# Patient Record
Sex: MALE | Race: WHITE | Employment: FULL TIME | URBAN - METROPOLITAN AREA
[De-identification: names, ages, dates, MRNs, and addresses within clinical notes are randomized per-mention and may not be internally consistent; named-entity substitution may affect disease eponyms.]

---

## 2024-02-22 ENCOUNTER — HOSPITAL ENCOUNTER (INPATIENT)
Age: 54
LOS: 1 days | Discharge: ANOTHER HOSPITAL | DRG: 683 | End: 2024-02-22
Attending: EMERGENCY MEDICINE | Admitting: STUDENT IN AN ORGANIZED HEALTH CARE EDUCATION/TRAINING PROGRAM
Payer: COMMERCIAL

## 2024-02-22 ENCOUNTER — APPOINTMENT (OUTPATIENT)
Dept: CT IMAGING | Age: 54
DRG: 683 | End: 2024-02-22
Payer: COMMERCIAL

## 2024-02-22 ENCOUNTER — HOSPITAL ENCOUNTER (INPATIENT)
Age: 54
LOS: 12 days | Discharge: HOME | DRG: 660 | End: 2024-03-05
Attending: STUDENT IN AN ORGANIZED HEALTH CARE EDUCATION/TRAINING PROGRAM | Admitting: INTERNAL MEDICINE
Payer: COMMERCIAL

## 2024-02-22 VITALS
WEIGHT: 180 LBS | OXYGEN SATURATION: 98 % | DIASTOLIC BLOOD PRESSURE: 76 MMHG | SYSTOLIC BLOOD PRESSURE: 126 MMHG | TEMPERATURE: 97.8 F | RESPIRATION RATE: 16 BRPM | HEIGHT: 70 IN | HEART RATE: 76 BPM | BODY MASS INDEX: 25.77 KG/M2

## 2024-02-22 DIAGNOSIS — N17.9 ACUTE RENAL FAILURE, UNSPECIFIED ACUTE RENAL FAILURE TYPE (HCC): Primary | ICD-10-CM

## 2024-02-22 DIAGNOSIS — D63.8 ANEMIA OF CHRONIC DISEASE: ICD-10-CM

## 2024-02-22 DIAGNOSIS — I47.20 V-TACH (HCC): Primary | ICD-10-CM

## 2024-02-22 PROBLEM — E83.39 HYPERPHOSPHATEMIA: Status: ACTIVE | Noted: 2024-02-22

## 2024-02-22 PROBLEM — D64.9 ANEMIA: Status: ACTIVE | Noted: 2024-02-22

## 2024-02-22 PROBLEM — E87.29 HIGH ANION GAP METABOLIC ACIDOSIS: Status: ACTIVE | Noted: 2024-02-22

## 2024-02-22 PROBLEM — E87.1 HYPONATREMIA: Status: ACTIVE | Noted: 2024-02-22

## 2024-02-22 PROBLEM — K21.9 GERD (GASTROESOPHAGEAL REFLUX DISEASE): Status: ACTIVE | Noted: 2024-02-22

## 2024-02-22 LAB
ALBUMIN SERPL-MCNC: 4.3 G/DL (ref 3.5–5)
ALBUMIN/GLOB SERPL: 1 (ref 0.4–1.6)
ALP SERPL-CCNC: 41 U/L (ref 50–136)
ALT SERPL-CCNC: 10 U/L (ref 12–65)
ANION GAP SERPL CALC-SCNC: 15 MMOL/L (ref 2–11)
APPEARANCE UR: CLEAR
AST SERPL-CCNC: 5 U/L (ref 15–37)
BACTERIA URNS QL MICRO: NEGATIVE /HPF
BASOPHILS # BLD: 0 K/UL (ref 0–0.2)
BASOPHILS NFR BLD: 1 % (ref 0–2)
BILIRUB SERPL-MCNC: 0.4 MG/DL (ref 0.2–1.1)
BILIRUB UR QL: NEGATIVE
BUN SERPL-MCNC: 182 MG/DL (ref 6–23)
CALCIUM SERPL-MCNC: 9.3 MG/DL (ref 8.3–10.4)
CASTS URNS QL MICRO: ABNORMAL /LPF
CHLORIDE SERPL-SCNC: 98 MMOL/L (ref 103–113)
CK SERPL-CCNC: 179 U/L (ref 21–215)
CO2 SERPL-SCNC: 21 MMOL/L (ref 21–32)
COLOR UR: ABNORMAL
CREAT SERPL-MCNC: 22 MG/DL (ref 0.8–1.5)
CREAT UR-MCNC: 75 MG/DL
CREAT UR-MCNC: 77 MG/DL
DIFFERENTIAL METHOD BLD: ABNORMAL
EKG ATRIAL RATE: 74 BPM
EKG DIAGNOSIS: NORMAL
EKG P AXIS: 60 DEGREES
EKG P-R INTERVAL: 164 MS
EKG Q-T INTERVAL: 424 MS
EKG QRS DURATION: 116 MS
EKG QTC CALCULATION (BAZETT): 470 MS
EKG R AXIS: 29 DEGREES
EKG T AXIS: 68 DEGREES
EKG VENTRICULAR RATE: 74 BPM
EOSINOPHIL # BLD: 0.3 K/UL (ref 0–0.8)
EOSINOPHIL NFR BLD: 6 % (ref 0.5–7.8)
EPI CELLS #/AREA URNS HPF: ABNORMAL /HPF
ERYTHROCYTE [DISTWIDTH] IN BLOOD BY AUTOMATED COUNT: 11.9 % (ref 11.9–14.6)
GLOBULIN SER CALC-MCNC: 4.2 G/DL (ref 2.8–4.5)
GLUCOSE SERPL-MCNC: 113 MG/DL (ref 65–100)
GLUCOSE UR STRIP.AUTO-MCNC: NEGATIVE MG/DL
HCT VFR BLD AUTO: 19 % (ref 41.1–50.3)
HCT VFR BLD AUTO: 20.6 % (ref 41.1–50.3)
HGB BLD-MCNC: 6.5 G/DL (ref 13.6–17.2)
HGB BLD-MCNC: 7 G/DL (ref 13.6–17.2)
HGB UR QL STRIP: NEGATIVE
HISTORY CHECK: NORMAL
IMM GRANULOCYTES # BLD AUTO: 0 K/UL (ref 0–0.5)
IMM GRANULOCYTES NFR BLD AUTO: 0 % (ref 0–5)
KETONES UR QL STRIP.AUTO: NEGATIVE MG/DL
LEUKOCYTE ESTERASE UR QL STRIP.AUTO: NEGATIVE
LYMPHOCYTES # BLD: 0.5 K/UL (ref 0.5–4.6)
LYMPHOCYTES NFR BLD: 9 % (ref 13–44)
MAGNESIUM SERPL-MCNC: 2.8 MG/DL (ref 1.8–2.4)
MCH RBC QN AUTO: 31.3 PG (ref 26.1–32.9)
MCHC RBC AUTO-ENTMCNC: 34.2 G/DL (ref 31.4–35)
MCV RBC AUTO: 91.3 FL (ref 82–102)
MONOCYTES # BLD: 0.6 K/UL (ref 0.1–1.3)
MONOCYTES NFR BLD: 11 % (ref 4–12)
NEUTS SEG # BLD: 3.9 K/UL (ref 1.7–8.2)
NEUTS SEG NFR BLD: 73 % (ref 43–78)
NITRITE UR QL STRIP.AUTO: NEGATIVE
NRBC # BLD: 0 K/UL (ref 0–0.2)
PH UR STRIP: 6 (ref 5–9)
PHOSPHATE SERPL-MCNC: 9.9 MG/DL (ref 2.5–4.5)
PLATELET # BLD AUTO: 228 K/UL (ref 150–450)
PMV BLD AUTO: 9 FL (ref 9.4–12.3)
POTASSIUM SERPL-SCNC: 4 MMOL/L (ref 3.5–5.1)
PROT SERPL-MCNC: 8.5 G/DL (ref 6.3–8.2)
PROT UR STRIP-MCNC: 30 MG/DL
PROT UR-MCNC: 39 MG/DL
PROT/CREAT UR-RTO: 0.5
RBC # BLD AUTO: 2.08 M/UL (ref 4.23–5.6)
RBC #/AREA URNS HPF: ABNORMAL /HPF
SODIUM SERPL-SCNC: 134 MMOL/L (ref 136–146)
SODIUM UR-SCNC: 44 MMOL/L
SP GR UR REFRACTOMETRY: 1.01 (ref 1–1.02)
UROBILINOGEN UR QL STRIP.AUTO: 0.2 EU/DL (ref 0.2–1)
WBC # BLD AUTO: 5.4 K/UL (ref 4.3–11.1)
WBC URNS QL MICRO: ABNORMAL /HPF

## 2024-02-22 PROCEDURE — 81001 URINALYSIS AUTO W/SCOPE: CPT

## 2024-02-22 PROCEDURE — 6360000002 HC RX W HCPCS: Performed by: PHYSICIAN ASSISTANT

## 2024-02-22 PROCEDURE — 2500000003 HC RX 250 WO HCPCS: Performed by: STUDENT IN AN ORGANIZED HEALTH CARE EDUCATION/TRAINING PROGRAM

## 2024-02-22 PROCEDURE — 36415 COLL VENOUS BLD VENIPUNCTURE: CPT

## 2024-02-22 PROCEDURE — 86900 BLOOD TYPING SEROLOGIC ABO: CPT

## 2024-02-22 PROCEDURE — 85014 HEMATOCRIT: CPT

## 2024-02-22 PROCEDURE — 85025 COMPLETE CBC W/AUTO DIFF WBC: CPT

## 2024-02-22 PROCEDURE — 86923 COMPATIBILITY TEST ELECTRIC: CPT

## 2024-02-22 PROCEDURE — 86850 RBC ANTIBODY SCREEN: CPT

## 2024-02-22 PROCEDURE — 86901 BLOOD TYPING SEROLOGIC RH(D): CPT

## 2024-02-22 PROCEDURE — 36430 TRANSFUSION BLD/BLD COMPNT: CPT

## 2024-02-22 PROCEDURE — 2580000003 HC RX 258: Performed by: STUDENT IN AN ORGANIZED HEALTH CARE EDUCATION/TRAINING PROGRAM

## 2024-02-22 PROCEDURE — 30233N1 TRANSFUSION OF NONAUTOLOGOUS RED BLOOD CELLS INTO PERIPHERAL VEIN, PERCUTANEOUS APPROACH: ICD-10-PCS | Performed by: STUDENT IN AN ORGANIZED HEALTH CARE EDUCATION/TRAINING PROGRAM

## 2024-02-22 PROCEDURE — 84300 ASSAY OF URINE SODIUM: CPT

## 2024-02-22 PROCEDURE — 1100000000 HC RM PRIVATE

## 2024-02-22 PROCEDURE — 93010 ELECTROCARDIOGRAM REPORT: CPT | Performed by: INTERNAL MEDICINE

## 2024-02-22 PROCEDURE — 82550 ASSAY OF CK (CPK): CPT

## 2024-02-22 PROCEDURE — 96374 THER/PROPH/DIAG INJ IV PUSH: CPT

## 2024-02-22 PROCEDURE — 2580000003 HC RX 258: Performed by: PHYSICIAN ASSISTANT

## 2024-02-22 PROCEDURE — P9016 RBC LEUKOCYTES REDUCED: HCPCS

## 2024-02-22 PROCEDURE — 93005 ELECTROCARDIOGRAM TRACING: CPT | Performed by: EMERGENCY MEDICINE

## 2024-02-22 PROCEDURE — 99285 EMERGENCY DEPT VISIT HI MDM: CPT

## 2024-02-22 PROCEDURE — 83735 ASSAY OF MAGNESIUM: CPT

## 2024-02-22 PROCEDURE — 80053 COMPREHEN METABOLIC PANEL: CPT

## 2024-02-22 PROCEDURE — 74176 CT ABD & PELVIS W/O CONTRAST: CPT

## 2024-02-22 PROCEDURE — 84100 ASSAY OF PHOSPHORUS: CPT

## 2024-02-22 PROCEDURE — 82570 ASSAY OF URINE CREATININE: CPT

## 2024-02-22 PROCEDURE — 84156 ASSAY OF PROTEIN URINE: CPT

## 2024-02-22 PROCEDURE — 85018 HEMOGLOBIN: CPT

## 2024-02-22 RX ORDER — ACETAMINOPHEN 650 MG/1
650 SUPPOSITORY RECTAL EVERY 6 HOURS PRN
Status: DISCONTINUED | OUTPATIENT
Start: 2024-02-22 | End: 2024-03-05 | Stop reason: HOSPADM

## 2024-02-22 RX ORDER — POLYETHYLENE GLYCOL 3350 17 G/17G
17 POWDER, FOR SOLUTION ORAL DAILY PRN
Status: CANCELLED | OUTPATIENT
Start: 2024-02-22

## 2024-02-22 RX ORDER — ONDANSETRON 2 MG/ML
4 INJECTION INTRAMUSCULAR; INTRAVENOUS EVERY 6 HOURS PRN
Status: DISCONTINUED | OUTPATIENT
Start: 2024-02-22 | End: 2024-03-05 | Stop reason: HOSPADM

## 2024-02-22 RX ORDER — SODIUM CHLORIDE 9 MG/ML
INJECTION, SOLUTION INTRAVENOUS PRN
Status: DISCONTINUED | OUTPATIENT
Start: 2024-02-22 | End: 2024-02-22 | Stop reason: HOSPADM

## 2024-02-22 RX ORDER — POLYETHYLENE GLYCOL 3350 17 G/17G
17 POWDER, FOR SOLUTION ORAL DAILY PRN
Status: DISCONTINUED | OUTPATIENT
Start: 2024-02-22 | End: 2024-02-22 | Stop reason: HOSPADM

## 2024-02-22 RX ORDER — SODIUM CHLORIDE 0.9 % (FLUSH) 0.9 %
5-40 SYRINGE (ML) INJECTION PRN
Status: DISCONTINUED | OUTPATIENT
Start: 2024-02-22 | End: 2024-02-22 | Stop reason: HOSPADM

## 2024-02-22 RX ORDER — ACETAMINOPHEN 650 MG/1
650 SUPPOSITORY RECTAL EVERY 6 HOURS PRN
Status: DISCONTINUED | OUTPATIENT
Start: 2024-02-22 | End: 2024-02-22 | Stop reason: HOSPADM

## 2024-02-22 RX ORDER — ONDANSETRON 2 MG/ML
4 INJECTION INTRAMUSCULAR; INTRAVENOUS
Status: COMPLETED | OUTPATIENT
Start: 2024-02-22 | End: 2024-02-22

## 2024-02-22 RX ORDER — HEPARIN SODIUM 5000 [USP'U]/ML
5000 INJECTION, SOLUTION INTRAVENOUS; SUBCUTANEOUS EVERY 8 HOURS SCHEDULED
Status: DISCONTINUED | OUTPATIENT
Start: 2024-02-23 | End: 2024-02-22 | Stop reason: HOSPADM

## 2024-02-22 RX ORDER — SODIUM CHLORIDE 0.9 % (FLUSH) 0.9 %
5-40 SYRINGE (ML) INJECTION PRN
Status: CANCELLED | OUTPATIENT
Start: 2024-02-22

## 2024-02-22 RX ORDER — ONDANSETRON 2 MG/ML
4 INJECTION INTRAMUSCULAR; INTRAVENOUS EVERY 6 HOURS PRN
Status: DISCONTINUED | OUTPATIENT
Start: 2024-02-22 | End: 2024-02-22 | Stop reason: HOSPADM

## 2024-02-22 RX ORDER — SODIUM CHLORIDE 0.9 % (FLUSH) 0.9 %
5-40 SYRINGE (ML) INJECTION PRN
Status: DISCONTINUED | OUTPATIENT
Start: 2024-02-22 | End: 2024-03-05 | Stop reason: HOSPADM

## 2024-02-22 RX ORDER — SODIUM CHLORIDE 0.9 % (FLUSH) 0.9 %
5-40 SYRINGE (ML) INJECTION EVERY 12 HOURS SCHEDULED
Status: CANCELLED | OUTPATIENT
Start: 2024-02-22

## 2024-02-22 RX ORDER — 0.9 % SODIUM CHLORIDE 0.9 %
1000 INTRAVENOUS SOLUTION INTRAVENOUS
Status: COMPLETED | OUTPATIENT
Start: 2024-02-22 | End: 2024-02-22

## 2024-02-22 RX ORDER — SODIUM CHLORIDE 0.9 % (FLUSH) 0.9 %
5-40 SYRINGE (ML) INJECTION EVERY 12 HOURS SCHEDULED
Status: DISCONTINUED | OUTPATIENT
Start: 2024-02-22 | End: 2024-03-05 | Stop reason: HOSPADM

## 2024-02-22 RX ORDER — ACETAMINOPHEN 325 MG/1
650 TABLET ORAL EVERY 6 HOURS PRN
Status: CANCELLED | OUTPATIENT
Start: 2024-02-22

## 2024-02-22 RX ORDER — ACETAMINOPHEN 325 MG/1
650 TABLET ORAL EVERY 6 HOURS PRN
Status: DISCONTINUED | OUTPATIENT
Start: 2024-02-22 | End: 2024-03-05 | Stop reason: HOSPADM

## 2024-02-22 RX ORDER — ONDANSETRON 4 MG/1
4 TABLET, ORALLY DISINTEGRATING ORAL EVERY 8 HOURS PRN
Status: CANCELLED | OUTPATIENT
Start: 2024-02-22

## 2024-02-22 RX ORDER — SODIUM CHLORIDE 9 MG/ML
INJECTION, SOLUTION INTRAVENOUS PRN
Status: CANCELLED | OUTPATIENT
Start: 2024-02-22

## 2024-02-22 RX ORDER — ONDANSETRON 4 MG/1
4 TABLET, ORALLY DISINTEGRATING ORAL EVERY 8 HOURS PRN
Status: DISCONTINUED | OUTPATIENT
Start: 2024-02-22 | End: 2024-02-22 | Stop reason: HOSPADM

## 2024-02-22 RX ORDER — SODIUM CHLORIDE 9 MG/ML
INJECTION, SOLUTION INTRAVENOUS PRN
Status: DISCONTINUED | OUTPATIENT
Start: 2024-02-22 | End: 2024-03-05 | Stop reason: HOSPADM

## 2024-02-22 RX ORDER — SODIUM CHLORIDE 0.9 % (FLUSH) 0.9 %
5-40 SYRINGE (ML) INJECTION EVERY 12 HOURS SCHEDULED
Status: DISCONTINUED | OUTPATIENT
Start: 2024-02-22 | End: 2024-02-22 | Stop reason: HOSPADM

## 2024-02-22 RX ORDER — ACETAMINOPHEN 650 MG/1
650 SUPPOSITORY RECTAL EVERY 6 HOURS PRN
Status: CANCELLED | OUTPATIENT
Start: 2024-02-22

## 2024-02-22 RX ORDER — ACETAMINOPHEN 325 MG/1
650 TABLET ORAL EVERY 6 HOURS PRN
Status: DISCONTINUED | OUTPATIENT
Start: 2024-02-22 | End: 2024-02-22 | Stop reason: HOSPADM

## 2024-02-22 RX ORDER — POLYETHYLENE GLYCOL 3350 17 G/17G
17 POWDER, FOR SOLUTION ORAL DAILY PRN
Status: DISCONTINUED | OUTPATIENT
Start: 2024-02-22 | End: 2024-03-05 | Stop reason: HOSPADM

## 2024-02-22 RX ORDER — ONDANSETRON 2 MG/ML
4 INJECTION INTRAMUSCULAR; INTRAVENOUS EVERY 6 HOURS PRN
Status: CANCELLED | OUTPATIENT
Start: 2024-02-22

## 2024-02-22 RX ORDER — ONDANSETRON 4 MG/1
4 TABLET, ORALLY DISINTEGRATING ORAL EVERY 8 HOURS PRN
Status: DISCONTINUED | OUTPATIENT
Start: 2024-02-22 | End: 2024-03-05 | Stop reason: HOSPADM

## 2024-02-22 RX ADMIN — SODIUM BICARBONATE: 84 INJECTION, SOLUTION INTRAVENOUS at 22:24

## 2024-02-22 RX ADMIN — SODIUM BICARBONATE: 84 INJECTION, SOLUTION INTRAVENOUS at 22:23

## 2024-02-22 RX ADMIN — SODIUM CHLORIDE, PRESERVATIVE FREE 10 ML: 5 INJECTION INTRAVENOUS at 20:32

## 2024-02-22 RX ADMIN — ONDANSETRON 4 MG: 2 INJECTION INTRAMUSCULAR; INTRAVENOUS at 14:14

## 2024-02-22 RX ADMIN — SODIUM BICARBONATE: 84 INJECTION, SOLUTION INTRAVENOUS at 18:25

## 2024-02-22 RX ADMIN — SODIUM CHLORIDE 1000 ML: 9 INJECTION, SOLUTION INTRAVENOUS at 14:15

## 2024-02-22 ASSESSMENT — LIFESTYLE VARIABLES
HOW MANY STANDARD DRINKS CONTAINING ALCOHOL DO YOU HAVE ON A TYPICAL DAY: PATIENT DOES NOT DRINK
HOW OFTEN DO YOU HAVE A DRINK CONTAINING ALCOHOL: NEVER

## 2024-02-22 ASSESSMENT — PAIN - FUNCTIONAL ASSESSMENT: PAIN_FUNCTIONAL_ASSESSMENT: NONE - DENIES PAIN

## 2024-02-22 NOTE — H&P (VIEW-ONLY)
Hospitalist History and Physical   Admit Date:  2024  1:45 PM   Name:  Saulo Ya   Age:  53 y.o.  Sex:  male  :  1970   MRN:  [id number]   Room:      Presenting/Chief Complaint: critically high labs     Reason(s) for Admission: CLAUDIA (acute kidney injury) (HCC) [N17.9]     History of Present Illness:   Saulo Ya is a 53 y.o. male with medical history of GERD who presented to the [facility] ED on 24 with cc of abnormal labs.  He had outpatient lab work done on 24 showing creatinine of 19.  He showed the lab work to a friend who recommended that he go to the ED. In the ED, labs showed sodium 134, , creatinine 22, WBC 5.4, and hemoglobin 6.5. He received 1L NS. Nephrology was consulted and recommended transfer to [facility] for higher level of care.  Prior to transfer, CT abdomen pelvis without contrast shows severe bilateral hydronephrosis, diffuse bladder wall thickening, and enlarged prostate     Patient's states that he has been feeling bad for several weeks.  Complains of occasional chills, nausea, muscle cramps, insomnia, and urinary hesitancy. States that he has trouble when he begins to urinate but does feel that he completely empties his bladder with each void. His only home medication is Prilosec and occasional Tylenol for headaches.  Denies any NSAID use.  No personal or family history of kidney disease, autoimmune disease, or cancer.  In 2023, he had an esophageal dilation for esophageal stricture and a colonoscopy that was reportedly normal.     Assessment & Plan:     CLAUDIA  -As evidenced by increase in serum creatinine greater than 0.3 in less than 48 hours  -Creatinine 22.00 on admission with recent labs on  showing creatinine of 19  -Unknown baseline  -CT shows severe bilateral hydronephrosis, diffuse bladder wall thickening, and enlarged prostate   -Place Payne catheter  -Start 75mEq bicarb in 0.45% NS at 125cc/hr  -FENa 9.4% suggesting post-renal

## 2024-02-22 NOTE — ED TRIAGE NOTES
Patient has been feeling sick, chills, nausea, off and on vomitting, foamy urine, trouble sleeping, no appetite.  He went got blood work done on Tuesday and got it back yesterday. The facility called him and told him to come get checked out due to critically high labs.

## 2024-02-22 NOTE — ED NOTES
TRANSFER - OUT REPORT:    Verbal report given to Tia RN on Saulo Ya  being transferred to [facility] for routine progression of patient care       Report consisted of patient's Situation, Background, Assessment and   Recommendations(SBAR).     Information from the following report(s) Nurse Handoff Report was reviewed with the receiving nurse.    Melanie Fall Assessment:    Presents to emergency department  because of falls (Syncope, seizure, or loss of consciousness): No  Age > 70: No  Altered Mental Status, Intoxication with alcohol or substance confusion (Disorientation, impaired judgment, poor safety awaremess, or inability to follow instructions): No  Impaired Mobility: Ambulates or transfers with assistive devices or assistance; Unable to ambulate or transer.: No  Nursing Judgement: No          Lines:   Peripheral IV 02/22/24 Left Antecubital (Active)   Site Assessment Clean, dry & intact 02/22/24 1342   Line Status Brisk blood return;Capped;Flushed;Normal saline locked;Specimen collected 02/22/24 1342   Phlebitis Assessment No symptoms 02/22/24 1342   Infiltration Assessment 0 02/22/24 1342   Dressing Status Clean, dry & intact;New dressing applied 02/22/24 1342   Dressing Type Transparent 02/22/24 1342   Dressing Intervention New 02/22/24 1342       Peripheral IV 02/22/24 Right Antecubital (Active)        Opportunity for questions and clarification was provided.      Patient transported with:  Tech

## 2024-02-22 NOTE — ED PROVIDER NOTES
Emergency Department Provider Note       PCP: No primary care provider on file.   Age: 53 y.o.   Sex: male     DISPOSITION Admitted 02/22/2024 04:02:50 PM       ICD-10-CM    1. Acute renal failure, unspecified acute renal failure type (HCC)  N17.9       2. Anemia of chronic disease  D63.8           Medical Decision Making     53-year-old male presenting to the emergency department today for evaluation of generalized unwell feeling for several months, outpatient labs that were abnormal.  Patient does appear somewhat pale but is otherwise in no acute distress.  Lungs clear to auscultation, abdomen soft and nontender.  Patient with hemoglobin of 6.5.  Creatinine is 22, .  Phosphorus 9.9.  Patient with proteinuria.  Discussed abnormal lab values with patient and ultimately recommend admission for evaluation of acute renal failure and anemia of chronic disease.  Patient in agreement with plan.  We also discussed risks versus benefits of blood transfusion which patient is agreeable with as well.  Patient was discussed with attending, Dr. Beckham as patient will require admission.  Will speak with hospitalist for admission.  ED Course as of 02/22/24 1654   Thu Feb 22, 2024   1355 Hemoglobin Quant(!!): 6.5 [KE]   1525 Spoke with the hospitalist regarding admission who requested I also consult nephrology as patient may require hemodialysis and transfer downtown. I sent message via perfect serve [KE]      ED Course User Index  [KE] Luzmaria Lewis PA     1 or more acute illnesses that pose a threat to life or bodily function.   Drug therapy given requiring intensive monitoring for toxicity.  Discussion with external consultants.  Shared medical decision making was utilized in creating the patients health plan today.    I independently ordered and reviewed each unique test.  I reviewed external records: previous lab results from [facility] (outpatient labs)        My Independent EKG Interpretation: sinus rhythm,

## 2024-02-22 NOTE — H&P
U/L   Sodium, urine, random    Collection Time: 02/22/24  1:31 PM   Result Value Ref Range    SODIUM, RANDOM URINE 44 MMOL/L   Creatinine, Random Urine    Collection Time: 02/22/24  1:31 PM   Result Value Ref Range    Creatinine, Ur 77.00 mg/dL   Protein / creatinine ratio, urine    Collection Time: 02/22/24  1:31 PM   Result Value Ref Range    Protein, Urine, Random 39 mg/dL    Creatinine, Ur 75.00 mg/dL    PROTEIN/CREAT RATIO URINE RAN 0.5     EKG 12 Lead    Collection Time: 02/22/24  1:44 PM   Result Value Ref Range    Ventricular Rate 74 BPM    Atrial Rate 74 BPM    P-R Interval 164 ms    QRS Duration 116 ms    Q-T Interval 424 ms    QTc Calculation (Bazett) 470 ms    P Axis 60 degrees    R Axis 29 degrees    T Axis 68 degrees    Diagnosis       Normal sinus rhythm  Normal ECG  No previous ECGs available  Confirmed by MD PETRA (), JINA ([id number]) on 2/22/2024 2:58:00 PM     TYPE AND SCREEN    Collection Time: 02/22/24  2:18 PM   Result Value Ref Range    Crossmatch expiration date 02/25/2024,2359     ABO/Rh O POSITIVE     Antibody Screen NEG     Blood Bank Comment CALLED ER TO INFORM BLOOD READY AT 1509     Unit Number [id number]     Product Code Blood Bank RC LR     Unit Divison 00     Dispense Status Blood Bank ISSUED     Crossmatch Result Compatible    PREPARE RBC (CROSSMATCH), 1 Units    Collection Time: 02/22/24  3:00 PM   Result Value Ref Range    History Check Historical check performed        I have personally reviewed imaging studies:  No results found.      Signed:  Ramiro Collier DO      Patient is critically ill.  Without intervention, there is a high probability of imminent acute organ impairment or life-threatening deterioration.  Total critical care time spent: 50 minutes.

## 2024-02-22 NOTE — DISCHARGE SUMMARY
Patient transferring to [facility] for higher level of care. See H&P.     Ramiro Collier, DO

## 2024-02-22 NOTE — CARE COORDINATION
Prep     Laundry     Vacuuming     Cleaning     Gardening     Yard Work     Driving     Shopping          Other (Comment)     Homemaking Responsibilities     Meal Prep Responsibility     Laundry Responsibility     Cleaning Responsibility     Bill Paying/Finance Responsibility     Shopping Responsibility     Dependent Care Responsibility     Health Care Management     Other (Comment)     Ambulation Assistance     Transfer Assistance     Active      Patient's  Info     Mode of Transportation     Education     Occupation     Type of Occupation       Discharge Planning   Type of Residence     Living Arrangements     Support Systems Friends/Neighbors   Current Services Prior To Admission     Potential Assistance Needed     DME     DME     DME Ordered?     Potential Assistance Purchasing Medications     Meds-to-Beds: Does the patient want to have any new prescriptions delivered to bedside prior to discharge?     Type of Home Care Services     Patient expects to be discharged to: House   Follow Up Appointment: Best Day/Time     One/Two Story Residence:     # of Interior Steps     Height of Each Step (in)     Interior Rails     Lift Chair Available     History of Falls?       Services At/After Discharge  Transition of Care Consult (CM Consult):     Internal Home Health     Internal Hospice     Reason Outside Agency Chosen     Partner SNF     Reason Why Partner SNF Not Chosen     Internal Comfort Care     Reason Outside Comfort Care Chosen     Services At/After Discharge      Resource Information Provided?     Mode of Transport at Discharge     Hospital Transport Time of Discharge     Confirm Follow Up Transport       Condition of Participation: Discharge Planning  The plan for Transition of Care is related to the following treatment goals:     The Patient and/or Patient Representative was provided with a Choice of Provider?     Name of the Patient Representative who was provided with the Choice

## 2024-02-22 NOTE — H&P
Patient transferred from [facility] for higher level of care. See [facility] H&P.    Ramiro Collier, DO

## 2024-02-23 PROBLEM — R33.9 URINE RETENTION: Status: ACTIVE | Noted: 2024-02-23

## 2024-02-23 PROBLEM — R31.9 HEMATURIA: Status: ACTIVE | Noted: 2024-02-23

## 2024-02-23 PROBLEM — D62 ACUTE BLOOD LOSS ANEMIA: Status: ACTIVE | Noted: 2024-02-23

## 2024-02-23 PROBLEM — N13.30 BILATERAL HYDRONEPHROSIS: Status: ACTIVE | Noted: 2024-02-23

## 2024-02-23 PROBLEM — R97.20 ELEVATED PSA: Status: ACTIVE | Noted: 2024-02-23

## 2024-02-23 PROBLEM — N32.89 BLADDER WALL THICKENING: Status: ACTIVE | Noted: 2024-02-23

## 2024-02-23 LAB
ABO + RH BLD: NORMAL
ALBUMIN SERPL-MCNC: 4 G/DL (ref 3.5–5)
ALBUMIN/GLOB SERPL: 1.1 (ref 0.4–1.6)
ALP SERPL-CCNC: 40 U/L (ref 50–136)
ALT SERPL-CCNC: 9 U/L (ref 12–65)
ANION GAP SERPL CALC-SCNC: 14 MMOL/L (ref 2–11)
AST SERPL-CCNC: <3 U/L (ref 15–37)
BASOPHILS # BLD: 0 K/UL (ref 0–0.2)
BASOPHILS NFR BLD: 1 % (ref 0–2)
BILIRUB SERPL-MCNC: 0.4 MG/DL (ref 0.2–1.1)
BLD PROD TYP BPU: NORMAL
BLOOD BANK CMNT PATIENT-IMP: NORMAL
BLOOD BANK DISPENSE STATUS: NORMAL
BLOOD GROUP ANTIBODIES SERPL: NORMAL
BPU ID: NORMAL
BUN SERPL-MCNC: 190 MG/DL (ref 6–23)
CALCIUM SERPL-MCNC: 9 MG/DL (ref 8.3–10.4)
CHLORIDE SERPL-SCNC: 104 MMOL/L (ref 103–113)
CO2 SERPL-SCNC: 21 MMOL/L (ref 21–32)
CREAT SERPL-MCNC: 21 MG/DL (ref 0.8–1.5)
CROSSMATCH RESULT: NORMAL
DIFFERENTIAL METHOD BLD: ABNORMAL
EOSINOPHIL # BLD: 0.2 K/UL (ref 0–0.8)
EOSINOPHIL NFR BLD: 4 % (ref 0.5–7.8)
ERYTHROCYTE [DISTWIDTH] IN BLOOD BY AUTOMATED COUNT: 12.1 % (ref 11.9–14.6)
FERRITIN SERPL-MCNC: 810 NG/ML (ref 8–388)
FOLATE SERPL-MCNC: 3.9 NG/ML (ref 3.1–17.5)
GLOBULIN SER CALC-MCNC: 3.7 G/DL (ref 2.8–4.5)
GLUCOSE SERPL-MCNC: 99 MG/DL (ref 65–100)
HAV IGM SER QL: NONREACTIVE
HBV CORE IGM SER QL: NONREACTIVE
HBV SURFACE AG SER QL: NONREACTIVE
HCT VFR BLD AUTO: 21.8 % (ref 41.1–50.3)
HCT VFR BLD AUTO: 23.4 % (ref 41.1–50.3)
HCT VFR BLD AUTO: 24 % (ref 41.1–50.3)
HCV AB SER QL: NONREACTIVE
HGB BLD-MCNC: 7.4 G/DL (ref 13.6–17.2)
HGB BLD-MCNC: 7.9 G/DL (ref 13.6–17.2)
HGB BLD-MCNC: 8.1 G/DL (ref 13.6–17.2)
HGB RETIC QN AUTO: 36 PG (ref 29–35)
IMM GRANULOCYTES # BLD AUTO: 0 K/UL (ref 0–0.5)
IMM GRANULOCYTES NFR BLD AUTO: 0 % (ref 0–5)
IMM RETICS NFR: 10.4 % (ref 2.3–13.4)
IRON SATN MFR SERPL: 38 %
IRON SERPL-MCNC: 96 UG/DL (ref 35–150)
LYMPHOCYTES # BLD: 0.4 K/UL (ref 0.5–4.6)
LYMPHOCYTES NFR BLD: 7 % (ref 13–44)
MAGNESIUM SERPL-MCNC: 2.7 MG/DL (ref 1.8–2.4)
MCH RBC QN AUTO: 31 PG (ref 26.1–32.9)
MCHC RBC AUTO-ENTMCNC: 33.9 G/DL (ref 31.4–35)
MCV RBC AUTO: 91.2 FL (ref 82–102)
MONOCYTES # BLD: 0.6 K/UL (ref 0.1–1.3)
MONOCYTES NFR BLD: 9 % (ref 4–12)
NEUTS SEG # BLD: 4.6 K/UL (ref 1.7–8.2)
NEUTS SEG NFR BLD: 79 % (ref 43–78)
NRBC # BLD: 0 K/UL (ref 0–0.2)
PLATELET # BLD AUTO: 193 K/UL (ref 150–450)
PMV BLD AUTO: 8.9 FL (ref 9.4–12.3)
POTASSIUM SERPL-SCNC: 3.9 MMOL/L (ref 3.5–5.1)
PROT SERPL-MCNC: 7.7 G/DL (ref 6.3–8.2)
PSA SERPL-MCNC: 112 NG/ML
RBC # BLD AUTO: 2.39 M/UL (ref 4.23–5.6)
RETICS # AUTO: 0.06 M/UL (ref 0.03–0.1)
RETICS/RBC NFR AUTO: 2.4 % (ref 0.3–2)
SODIUM SERPL-SCNC: 139 MMOL/L (ref 136–146)
SPECIMEN EXP DATE BLD: NORMAL
TIBC SERPL-MCNC: 254 UG/DL (ref 250–450)
UNIT DIVISION: 0
VIT B12 SERPL-MCNC: 229 PG/ML (ref 193–986)
WBC # BLD AUTO: 5.8 K/UL (ref 4.3–11.1)

## 2024-02-23 PROCEDURE — 51798 US URINE CAPACITY MEASURE: CPT

## 2024-02-23 PROCEDURE — 85025 COMPLETE CBC W/AUTO DIFF WBC: CPT

## 2024-02-23 PROCEDURE — 83540 ASSAY OF IRON: CPT

## 2024-02-23 PROCEDURE — 85046 RETICYTE/HGB CONCENTRATE: CPT

## 2024-02-23 PROCEDURE — 85018 HEMOGLOBIN: CPT

## 2024-02-23 PROCEDURE — 99223 1ST HOSP IP/OBS HIGH 75: CPT | Performed by: UROLOGY

## 2024-02-23 PROCEDURE — 36415 COLL VENOUS BLD VENIPUNCTURE: CPT

## 2024-02-23 PROCEDURE — 80074 ACUTE HEPATITIS PANEL: CPT

## 2024-02-23 PROCEDURE — 80053 COMPREHEN METABOLIC PANEL: CPT

## 2024-02-23 PROCEDURE — 2580000003 HC RX 258: Performed by: STUDENT IN AN ORGANIZED HEALTH CARE EDUCATION/TRAINING PROGRAM

## 2024-02-23 PROCEDURE — 51702 INSERT TEMP BLADDER CATH: CPT

## 2024-02-23 PROCEDURE — 85014 HEMATOCRIT: CPT

## 2024-02-23 PROCEDURE — 6370000000 HC RX 637 (ALT 250 FOR IP): Performed by: NURSE PRACTITIONER

## 2024-02-23 PROCEDURE — 2500000003 HC RX 250 WO HCPCS: Performed by: INTERNAL MEDICINE

## 2024-02-23 PROCEDURE — 1100000003 HC PRIVATE W/ TELEMETRY

## 2024-02-23 PROCEDURE — 1100000000 HC RM PRIVATE

## 2024-02-23 PROCEDURE — 83550 IRON BINDING TEST: CPT

## 2024-02-23 PROCEDURE — 2500000003 HC RX 250 WO HCPCS: Performed by: STUDENT IN AN ORGANIZED HEALTH CARE EDUCATION/TRAINING PROGRAM

## 2024-02-23 PROCEDURE — 82607 VITAMIN B-12: CPT

## 2024-02-23 PROCEDURE — 6360000002 HC RX W HCPCS: Performed by: FAMILY MEDICINE

## 2024-02-23 PROCEDURE — 82728 ASSAY OF FERRITIN: CPT

## 2024-02-23 PROCEDURE — 6370000000 HC RX 637 (ALT 250 FOR IP): Performed by: INTERNAL MEDICINE

## 2024-02-23 PROCEDURE — 82746 ASSAY OF FOLIC ACID SERUM: CPT

## 2024-02-23 PROCEDURE — 84153 ASSAY OF PSA TOTAL: CPT

## 2024-02-23 PROCEDURE — 83735 ASSAY OF MAGNESIUM: CPT

## 2024-02-23 RX ORDER — OXYCODONE HYDROCHLORIDE 5 MG/1
5 TABLET ORAL EVERY 8 HOURS PRN
Status: DISCONTINUED | OUTPATIENT
Start: 2024-02-23 | End: 2024-03-05 | Stop reason: HOSPADM

## 2024-02-23 RX ORDER — MORPHINE SULFATE 2 MG/ML
2 INJECTION, SOLUTION INTRAMUSCULAR; INTRAVENOUS ONCE
Status: COMPLETED | OUTPATIENT
Start: 2024-02-23 | End: 2024-02-23

## 2024-02-23 RX ORDER — HYDRALAZINE HYDROCHLORIDE 20 MG/ML
10 INJECTION INTRAMUSCULAR; INTRAVENOUS EVERY 6 HOURS PRN
Status: DISCONTINUED | OUTPATIENT
Start: 2024-02-23 | End: 2024-03-05 | Stop reason: HOSPADM

## 2024-02-23 RX ORDER — MORPHINE SULFATE 2 MG/ML
1 INJECTION, SOLUTION INTRAMUSCULAR; INTRAVENOUS ONCE
Status: COMPLETED | OUTPATIENT
Start: 2024-02-23 | End: 2024-02-23

## 2024-02-23 RX ADMIN — TUBERCULIN PURIFIED PROTEIN DERIVATIVE 5 UNITS: 5 INJECTION, SOLUTION INTRADERMAL at 15:51

## 2024-02-23 RX ADMIN — OXYCODONE 5 MG: 5 TABLET ORAL at 08:13

## 2024-02-23 RX ADMIN — HYOSCYAMINE SULFATE 125 MCG: 0.12 TABLET ORAL; SUBLINGUAL at 16:27

## 2024-02-23 RX ADMIN — HYOSCYAMINE SULFATE 125 MCG: 0.12 TABLET ORAL; SUBLINGUAL at 22:42

## 2024-02-23 RX ADMIN — SODIUM CHLORIDE, PRESERVATIVE FREE 10 ML: 5 INJECTION INTRAVENOUS at 21:05

## 2024-02-23 RX ADMIN — HYOSCYAMINE SULFATE 125 MCG: 0.12 TABLET ORAL; SUBLINGUAL at 08:13

## 2024-02-23 RX ADMIN — SODIUM BICARBONATE: 84 INJECTION, SOLUTION INTRAVENOUS at 16:28

## 2024-02-23 RX ADMIN — HYOSCYAMINE SULFATE 125 MCG: 0.12 TABLET ORAL; SUBLINGUAL at 12:22

## 2024-02-23 RX ADMIN — OXYCODONE 5 MG: 5 TABLET ORAL at 15:46

## 2024-02-23 RX ADMIN — MORPHINE SULFATE 1 MG: 2 INJECTION, SOLUTION INTRAMUSCULAR; INTRAVENOUS at 03:08

## 2024-02-23 RX ADMIN — MORPHINE SULFATE 2 MG: 2 INJECTION, SOLUTION INTRAMUSCULAR; INTRAVENOUS at 05:00

## 2024-02-23 ASSESSMENT — PAIN SCALES - GENERAL
PAINLEVEL_OUTOF10: 10
PAINLEVEL_OUTOF10: 3
PAINLEVEL_OUTOF10: 0
PAINLEVEL_OUTOF10: 9
PAINLEVEL_OUTOF10: 4
PAINLEVEL_OUTOF10: 4
PAINLEVEL_OUTOF10: 0
PAINLEVEL_OUTOF10: 6
PAINLEVEL_OUTOF10: 4
PAINLEVEL_OUTOF10: 10

## 2024-02-23 ASSESSMENT — PAIN DESCRIPTION - LOCATION
LOCATION: OTHER (COMMENT)
LOCATION: PENIS
LOCATION: PENIS
LOCATION: ABDOMEN
LOCATION: ABDOMEN
LOCATION: PENIS

## 2024-02-23 ASSESSMENT — PAIN DESCRIPTION - ORIENTATION
ORIENTATION: LOWER
ORIENTATION: LOWER

## 2024-02-23 ASSESSMENT — PAIN DESCRIPTION - DESCRIPTORS
DESCRIPTORS: BURNING;DISCOMFORT
DESCRIPTORS: SPASM
DESCRIPTORS: SPASM
DESCRIPTORS: BURNING;DISCOMFORT
DESCRIPTORS: BURNING
DESCRIPTORS: SPASM

## 2024-02-23 NOTE — FLOWSHEET NOTE
02/23/24 1129   Urinary Catheter 02/23/24 Payne   Placement Date/Time: 02/23/24 0245   Present on Admission/Arrival: No  Inserted by: Deloris BERNSTEIN  2nd Staff Assisting: Danya BERNSTEIN  Insertion attempts: 1  Catheter Type: Payne  Catheter Size: 16 FR  Catheter Balloon Size: 10 mL  Insertion Procedure Practices: S...   Catheter Indications Urinary retention (acute or chronic), continuous bladder irrigation or bladder outlet obstruction   Site Assessment No urethral drainage   Urine Color Bloody   Urine Appearance Clear   Collection Container Standard   Securement Method Securing device (Describe)   Catheter Best Practices  Drainage tube clipped to bed;Catheter secured to thigh;Tamper seal intact;Bag below bladder;Bag not on floor;Lack of dependent loop in tubing;Drainage bag less than half full   Status Manually irrigated;Draining;Patent  (Q4 bladder irrigation)   Manual Irrigation Volume Input (mL) 30 mL   Output (mL) 30 mL   Urine Assessment   Urinary Status Payne;Hematuria   Urine Color Red   Urine Appearance Blood clots   Urine Odor No odor

## 2024-02-23 NOTE — CARE COORDINATION
MSN, CM:  spoke with patient this afternoon about discharge planning.  Patient lives alone in own apartment with 4 steps for entrance.  Patient is independent with all ADL's and requires no equipment for ambulation.  Patient denies any HH, rehab, or home oxygen currently.  Patient was a transfer from [facility] for possible HD.  Patients' BUN at 182 and Creatinine at 22.  CT revealed bilateral hydronephrosis.  Patient's Hgb 6.5 in which he received 1 PRBC overnight.  Patient started on IV fluid with a Neurology and Nephrology consult placed.  Patient has no discharge needs at this time.  Case Management will continue to follow for any discharge needs that may arise.         02/23/24 1304   Service Assessment   Patient Orientation Alert and Oriented   Cognition Alert   History Provided By Patient   Primary Caregiver Self   Support Systems Family Members   Patient's Healthcare Decision Maker is: Legal Next of Kin   PCP Verified by CM No  ([facility] referred)   Prior Functional Level Independent in ADLs/IADLs   Current Functional Level Independent in ADLs/IADLs   Can patient return to prior living arrangement Yes   Ability to make needs known: Good   Family able to assist with home care needs: Yes   Would you like for me to discuss the discharge plan with any other family members/significant others, and if so, who? No   Financial Resources Other (Comment)  (BCBS)   Community Resources None   CM/SW Referral No PCP   Social/Functional History   Lives With Alone   Type of Home Apartment   Home Layout One level   Home Access Stairs to enter with rails   Entrance Stairs - Number of Steps 4   Entrance Stairs - Rails Both   Bathroom Shower/Tub Tub/Shower unit   Bathroom Toilet Standard   Bathroom Equipment None   Home Equipment Cane   Receives Help From Family   ADL Assistance Independent   Homemaking Assistance Independent   Homemaking Responsibilities Yes   Ambulation Assistance Independent   Transfer Assistance Independent

## 2024-02-23 NOTE — ACP (ADVANCE CARE PLANNING)
Advance Care Planning     General Advance Care Planning (ACP) Conversation    Date of Conversation: 2/22/2024  Conducted with: Patient with Decision Making Capacity    Healthcare Decision Maker:    Primary Decision Maker: SATHYA SEBASTIAN - Brother/Sister - [phone]  Click here to complete Healthcare Decision Makers including selection of the Healthcare Decision Maker Relationship (ie \"Primary\").  Today we referred to ACP Clinical Specialist for assistance.    Content/Action Overview:  Has NO ACP documents-Information provided  Reviewed DNR/DNI and patient elects Full Code (Attempt Resuscitation)        Length of Voluntary ACP Conversation in minutes:  <16 minutes (Non-Billable)    Lian Lee RN

## 2024-02-23 NOTE — CONSULTS
MG/DL    Total Bilirubin 0.4 0.2 - 1.1 MG/DL    ALT 9 (L) 12 - 65 U/L    AST <3 (L) 15 - 37 U/L    Alk Phosphatase 40 (L) 50 - 136 U/L    Total Protein 7.7 6.3 - 8.2 g/dL    Albumin 4.0 3.5 - 5.0 g/dL    Globulin 3.7 2.8 - 4.5 g/dL    Albumin/Globulin Ratio 1.1 0.4 - 1.6     CBC with Auto Differential    Collection Time: 02/23/24  4:40 AM   Result Value Ref Range    WBC 5.8 4.3 - 11.1 K/uL    RBC 2.39 (L) 4.23 - 5.6 M/uL    Hemoglobin 7.4 (L) 13.6 - 17.2 g/dL    Hematocrit 21.8 (L) 41.1 - 50.3 %    MCV 91.2 82 - 102 FL    MCH 31.0 26.1 - 32.9 PG    MCHC 33.9 31.4 - 35.0 g/dL    RDW 12.1 11.9 - 14.6 %    Platelets 193 150 - 450 K/uL    MPV 8.9 (L) 9.4 - 12.3 FL    nRBC 0.00 0.0 - 0.2 K/uL    Differential Type AUTOMATED      Neutrophils % 79 (H) 43 - 78 %    Lymphocytes % 7 (L) 13 - 44 %    Monocytes % 9 4.0 - 12.0 %    Eosinophils % 4 0.5 - 7.8 %    Basophils % 1 0.0 - 2.0 %    Immature Granulocytes 0 0.0 - 5.0 %    Neutrophils Absolute 4.6 1.7 - 8.2 K/UL    Lymphocytes Absolute 0.4 (L) 0.5 - 4.6 K/UL    Monocytes Absolute 0.6 0.1 - 1.3 K/UL    Eosinophils Absolute 0.2 0.0 - 0.8 K/UL    Basophils Absolute 0.0 0.0 - 0.2 K/UL    Absolute Immature Granulocyte 0.0 0.0 - 0.5 K/UL   Transferrin Saturation    Collection Time: 02/23/24  4:40 AM   Result Value Ref Range    Iron 96 35 - 150 ug/dL    TIBC 254 250 - 450 ug/dL    TRANSFERRIN SATURATION 38 >20 %   Reticulocytes    Collection Time: 02/23/24  4:40 AM   Result Value Ref Range    Reticulocyte Count,Automated 2.4 (H) 0.3 - 2.0 %    Absolute Retic # 0.0569 0.026 - 0.095 M/ul    Immature Retic Fraction 10.4 2.3 - 13.4 %    Retic Hemoglobin conc. 36 (H) 29 - 35 pg   Hepatitis Panel, Acute    Collection Time: 02/23/24  4:40 AM   Result Value Ref Range    Hep A IgM NONREACTIVE NR      Hep B Core Ab, IgM NONREACTIVE NR      Hepatitis B Surface Ag NONREACTIVE NR      Hepatitis C Ab NONREACTIVE NR     Vitamin B12    Collection Time: 02/23/24  4:40 AM   Result Value Ref Range 
obstructive uropathy most likely due to prostate issue   Creat uo to 22 . No baseline creat.  There is thinning of renal cortex , most likely element of chronicity   Payne cath. In place  Continue with IVF  Urology to see     Anemia.  Check iron, Vit B12 Folate stool for blood    elevated Phos: binder      Mild acidosis     Rhx

## 2024-02-24 LAB
ANION GAP SERPL CALC-SCNC: 14 MMOL/L (ref 2–11)
BASOPHILS # BLD: 0 K/UL (ref 0–0.2)
BASOPHILS NFR BLD: 1 % (ref 0–2)
BUN SERPL-MCNC: 178 MG/DL (ref 6–23)
CALCIUM SERPL-MCNC: 8.8 MG/DL (ref 8.3–10.4)
CHLORIDE SERPL-SCNC: 105 MMOL/L (ref 103–113)
CO2 SERPL-SCNC: 23 MMOL/L (ref 21–32)
CREAT SERPL-MCNC: 19.7 MG/DL (ref 0.8–1.5)
DIFFERENTIAL METHOD BLD: ABNORMAL
EOSINOPHIL # BLD: 0.2 K/UL (ref 0–0.8)
EOSINOPHIL NFR BLD: 4 % (ref 0.5–7.8)
ERYTHROCYTE [DISTWIDTH] IN BLOOD BY AUTOMATED COUNT: 12.6 % (ref 11.9–14.6)
GLUCOSE SERPL-MCNC: 94 MG/DL (ref 65–100)
HCT VFR BLD AUTO: 19.6 % (ref 41.1–50.3)
HCT VFR BLD AUTO: 22.1 % (ref 41.1–50.3)
HCT VFR BLD AUTO: 22.6 % (ref 41.1–50.3)
HGB BLD-MCNC: 6.4 G/DL (ref 13.6–17.2)
HGB BLD-MCNC: 7.3 G/DL (ref 13.6–17.2)
HGB BLD-MCNC: 7.7 G/DL (ref 13.6–17.2)
IMM GRANULOCYTES # BLD AUTO: 0 K/UL (ref 0–0.5)
IMM GRANULOCYTES NFR BLD AUTO: 0 % (ref 0–5)
LYMPHOCYTES # BLD: 0.4 K/UL (ref 0.5–4.6)
LYMPHOCYTES NFR BLD: 8 % (ref 13–44)
MCH RBC QN AUTO: 30.7 PG (ref 26.1–32.9)
MCHC RBC AUTO-ENTMCNC: 33 G/DL (ref 31.4–35)
MCV RBC AUTO: 92.9 FL (ref 82–102)
MM INDURATION, POC: 0 MM (ref 0–5)
MONOCYTES # BLD: 0.5 K/UL (ref 0.1–1.3)
MONOCYTES NFR BLD: 9 % (ref 4–12)
NEUTS SEG # BLD: 4.4 K/UL (ref 1.7–8.2)
NEUTS SEG NFR BLD: 78 % (ref 43–78)
NRBC # BLD: 0 K/UL (ref 0–0.2)
PLATELET # BLD AUTO: 207 K/UL (ref 150–450)
PMV BLD AUTO: 9.1 FL (ref 9.4–12.3)
POTASSIUM SERPL-SCNC: 4.2 MMOL/L (ref 3.5–5.1)
PPD, POC: NEGATIVE
RBC # BLD AUTO: 2.38 M/UL (ref 4.23–5.6)
SARS-COV-2 RDRP RESP QL NAA+PROBE: NOT DETECTED
SODIUM SERPL-SCNC: 142 MMOL/L (ref 136–146)
SOURCE: NORMAL
WBC # BLD AUTO: 5.6 K/UL (ref 4.3–11.1)

## 2024-02-24 PROCEDURE — 6370000000 HC RX 637 (ALT 250 FOR IP): Performed by: NURSE PRACTITIONER

## 2024-02-24 PROCEDURE — 80048 BASIC METABOLIC PNL TOTAL CA: CPT

## 2024-02-24 PROCEDURE — 2580000003 HC RX 258: Performed by: STUDENT IN AN ORGANIZED HEALTH CARE EDUCATION/TRAINING PROGRAM

## 2024-02-24 PROCEDURE — 2580000003 HC RX 258: Performed by: INTERNAL MEDICINE

## 2024-02-24 PROCEDURE — 85025 COMPLETE CBC W/AUTO DIFF WBC: CPT

## 2024-02-24 PROCEDURE — 36415 COLL VENOUS BLD VENIPUNCTURE: CPT

## 2024-02-24 PROCEDURE — 87635 SARS-COV-2 COVID-19 AMP PRB: CPT

## 2024-02-24 PROCEDURE — 99232 SBSQ HOSP IP/OBS MODERATE 35: CPT | Performed by: UROLOGY

## 2024-02-24 PROCEDURE — 85014 HEMATOCRIT: CPT

## 2024-02-24 PROCEDURE — 1100000003 HC PRIVATE W/ TELEMETRY

## 2024-02-24 PROCEDURE — 85018 HEMOGLOBIN: CPT

## 2024-02-24 RX ORDER — SODIUM CHLORIDE 9 MG/ML
INJECTION, SOLUTION INTRAVENOUS PRN
Status: DISCONTINUED | OUTPATIENT
Start: 2024-02-24 | End: 2024-03-05 | Stop reason: HOSPADM

## 2024-02-24 RX ORDER — SODIUM CHLORIDE 9 MG/ML
INJECTION, SOLUTION INTRAVENOUS CONTINUOUS
Status: DISCONTINUED | OUTPATIENT
Start: 2024-02-24 | End: 2024-02-27

## 2024-02-24 RX ADMIN — HYOSCYAMINE SULFATE 125 MCG: 0.12 TABLET ORAL; SUBLINGUAL at 09:02

## 2024-02-24 RX ADMIN — HYOSCYAMINE SULFATE 125 MCG: 0.12 TABLET ORAL; SUBLINGUAL at 14:27

## 2024-02-24 RX ADMIN — SODIUM CHLORIDE, PRESERVATIVE FREE 10 ML: 5 INJECTION INTRAVENOUS at 09:09

## 2024-02-24 RX ADMIN — SODIUM CHLORIDE: 9 INJECTION, SOLUTION INTRAVENOUS at 10:23

## 2024-02-24 RX ADMIN — HYOSCYAMINE SULFATE 125 MCG: 0.12 TABLET ORAL; SUBLINGUAL at 18:01

## 2024-02-24 RX ADMIN — HYOSCYAMINE SULFATE 125 MCG: 0.12 TABLET ORAL; SUBLINGUAL at 23:30

## 2024-02-24 RX ADMIN — SODIUM CHLORIDE, PRESERVATIVE FREE 10 ML: 5 INJECTION INTRAVENOUS at 21:38

## 2024-02-24 RX ADMIN — SODIUM CHLORIDE: 9 INJECTION, SOLUTION INTRAVENOUS at 23:20

## 2024-02-24 ASSESSMENT — PAIN DESCRIPTION - DESCRIPTORS
DESCRIPTORS: CRAMPING;ACHING
DESCRIPTORS: CRAMPING

## 2024-02-24 ASSESSMENT — PAIN SCALES - GENERAL
PAINLEVEL_OUTOF10: 5
PAINLEVEL_OUTOF10: 3
PAINLEVEL_OUTOF10: 6
PAINLEVEL_OUTOF10: 0

## 2024-02-24 ASSESSMENT — PAIN DESCRIPTION - ORIENTATION: ORIENTATION: MID;ANTERIOR

## 2024-02-24 ASSESSMENT — PAIN DESCRIPTION - LOCATION
LOCATION: PELVIS
LOCATION: GROIN

## 2024-02-25 ENCOUNTER — ANESTHESIA (OUTPATIENT)
Dept: SURGERY | Age: 54
End: 2024-02-25
Payer: COMMERCIAL

## 2024-02-25 ENCOUNTER — ANESTHESIA EVENT (OUTPATIENT)
Dept: SURGERY | Age: 54
End: 2024-02-25
Payer: COMMERCIAL

## 2024-02-25 LAB
ANION GAP SERPL CALC-SCNC: 15 MMOL/L (ref 2–11)
BASOPHILS # BLD: 0 K/UL (ref 0–0.2)
BASOPHILS NFR BLD: 1 % (ref 0–2)
BUN SERPL-MCNC: 176 MG/DL (ref 6–23)
CALCIUM SERPL-MCNC: 7.9 MG/DL (ref 8.3–10.4)
CHLORIDE SERPL-SCNC: 109 MMOL/L (ref 103–113)
CO2 SERPL-SCNC: 22 MMOL/L (ref 21–32)
CREAT SERPL-MCNC: 20 MG/DL (ref 0.8–1.5)
DIFFERENTIAL METHOD BLD: ABNORMAL
EOSINOPHIL # BLD: 0.3 K/UL (ref 0–0.8)
EOSINOPHIL NFR BLD: 5 % (ref 0.5–7.8)
ERYTHROCYTE [DISTWIDTH] IN BLOOD BY AUTOMATED COUNT: 12.5 % (ref 11.9–14.6)
EST. AVERAGE GLUCOSE BLD GHB EST-MCNC: 100 MG/DL
GLUCOSE SERPL-MCNC: 102 MG/DL (ref 65–100)
HBA1C MFR BLD: 5.1 % (ref 4.8–5.6)
HCT VFR BLD AUTO: 23.9 % (ref 41.1–50.3)
HCT VFR BLD AUTO: 24.2 % (ref 41.1–50.3)
HCT VFR BLD AUTO: 24.5 % (ref 41.1–50.3)
HGB BLD-MCNC: 8 G/DL (ref 13.6–17.2)
HGB BLD-MCNC: 8 G/DL (ref 13.6–17.2)
HGB BLD-MCNC: 8.1 G/DL (ref 13.6–17.2)
HISTORY CHECK: NORMAL
IMM GRANULOCYTES # BLD AUTO: 0.1 K/UL (ref 0–0.5)
IMM GRANULOCYTES NFR BLD AUTO: 1 % (ref 0–5)
LYMPHOCYTES # BLD: 0.6 K/UL (ref 0.5–4.6)
LYMPHOCYTES NFR BLD: 10 % (ref 13–44)
MCH RBC QN AUTO: 31.8 PG (ref 26.1–32.9)
MCHC RBC AUTO-ENTMCNC: 33.5 G/DL (ref 31.4–35)
MCV RBC AUTO: 94.9 FL (ref 82–102)
MM INDURATION, POC: 0 MM (ref 0–5)
MONOCYTES # BLD: 0.7 K/UL (ref 0.1–1.3)
MONOCYTES NFR BLD: 11 % (ref 4–12)
NEUTS SEG # BLD: 4.5 K/UL (ref 1.7–8.2)
NEUTS SEG NFR BLD: 72 % (ref 43–78)
NRBC # BLD: 0 K/UL (ref 0–0.2)
PLATELET # BLD AUTO: 179 K/UL (ref 150–450)
PMV BLD AUTO: 9.2 FL (ref 9.4–12.3)
POTASSIUM SERPL-SCNC: 3.8 MMOL/L (ref 3.5–5.1)
PPD, POC: NEGATIVE
RBC # BLD AUTO: 2.55 M/UL (ref 4.23–5.6)
SODIUM SERPL-SCNC: 146 MMOL/L (ref 136–146)
WBC # BLD AUTO: 6.1 K/UL (ref 4.3–11.1)

## 2024-02-25 PROCEDURE — 6360000002 HC RX W HCPCS: Performed by: ANESTHESIOLOGY

## 2024-02-25 PROCEDURE — BT141ZZ FLUOROSCOPY OF KIDNEYS, URETERS AND BLADDER USING LOW OSMOLAR CONTRAST: ICD-10-PCS | Performed by: UROLOGY

## 2024-02-25 PROCEDURE — 99232 SBSQ HOSP IP/OBS MODERATE 35: CPT | Performed by: UROLOGY

## 2024-02-25 PROCEDURE — 2580000003 HC RX 258: Performed by: ANESTHESIOLOGY

## 2024-02-25 PROCEDURE — 0TJ98ZZ INSPECTION OF URETER, VIA NATURAL OR ARTIFICIAL OPENING ENDOSCOPIC: ICD-10-PCS | Performed by: UROLOGY

## 2024-02-25 PROCEDURE — 7100000000 HC PACU RECOVERY - FIRST 15 MIN: Performed by: UROLOGY

## 2024-02-25 PROCEDURE — 86850 RBC ANTIBODY SCREEN: CPT

## 2024-02-25 PROCEDURE — 85025 COMPLETE CBC W/AUTO DIFF WBC: CPT

## 2024-02-25 PROCEDURE — 3700000001 HC ADD 15 MINUTES (ANESTHESIA): Performed by: UROLOGY

## 2024-02-25 PROCEDURE — 0T788DZ DILATION OF BILATERAL URETERS WITH INTRALUMINAL DEVICE, VIA NATURAL OR ARTIFICIAL OPENING ENDOSCOPIC: ICD-10-PCS | Performed by: UROLOGY

## 2024-02-25 PROCEDURE — 80048 BASIC METABOLIC PNL TOTAL CA: CPT

## 2024-02-25 PROCEDURE — 86923 COMPATIBILITY TEST ELECTRIC: CPT

## 2024-02-25 PROCEDURE — P9016 RBC LEUKOCYTES REDUCED: HCPCS

## 2024-02-25 PROCEDURE — 6370000000 HC RX 637 (ALT 250 FOR IP): Performed by: NURSE PRACTITIONER

## 2024-02-25 PROCEDURE — 6370000000 HC RX 637 (ALT 250 FOR IP): Performed by: ANESTHESIOLOGY

## 2024-02-25 PROCEDURE — 2709999900 HC NON-CHARGEABLE SUPPLY: Performed by: UROLOGY

## 2024-02-25 PROCEDURE — C1758 CATHETER, URETERAL: HCPCS | Performed by: UROLOGY

## 2024-02-25 PROCEDURE — 86900 BLOOD TYPING SEROLOGIC ABO: CPT

## 2024-02-25 PROCEDURE — 86901 BLOOD TYPING SEROLOGIC RH(D): CPT

## 2024-02-25 PROCEDURE — C1747 HC ENDOSCOPE, SINGLE, URINARY TRACT: HCPCS | Performed by: UROLOGY

## 2024-02-25 PROCEDURE — 7100000001 HC PACU RECOVERY - ADDTL 15 MIN: Performed by: UROLOGY

## 2024-02-25 PROCEDURE — 85014 HEMATOCRIT: CPT

## 2024-02-25 PROCEDURE — C2617 STENT, NON-COR, TEM W/O DEL: HCPCS | Performed by: UROLOGY

## 2024-02-25 PROCEDURE — 52332 CYSTOSCOPY AND TREATMENT: CPT | Performed by: UROLOGY

## 2024-02-25 PROCEDURE — 1100000003 HC PRIVATE W/ TELEMETRY

## 2024-02-25 PROCEDURE — 6360000002 HC RX W HCPCS

## 2024-02-25 PROCEDURE — 2580000003 HC RX 258: Performed by: STUDENT IN AN ORGANIZED HEALTH CARE EDUCATION/TRAINING PROGRAM

## 2024-02-25 PROCEDURE — 2580000003 HC RX 258: Performed by: INTERNAL MEDICINE

## 2024-02-25 PROCEDURE — 2720000010 HC SURG SUPPLY STERILE: Performed by: UROLOGY

## 2024-02-25 PROCEDURE — 30233N1 TRANSFUSION OF NONAUTOLOGOUS RED BLOOD CELLS INTO PERIPHERAL VEIN, PERCUTANEOUS APPROACH: ICD-10-PCS | Performed by: INTERNAL MEDICINE

## 2024-02-25 PROCEDURE — 36430 TRANSFUSION BLD/BLD COMPNT: CPT

## 2024-02-25 PROCEDURE — 52351 CYSTOURETERO & OR PYELOSCOPE: CPT | Performed by: UROLOGY

## 2024-02-25 PROCEDURE — 2500000003 HC RX 250 WO HCPCS

## 2024-02-25 PROCEDURE — 85018 HEMOGLOBIN: CPT

## 2024-02-25 PROCEDURE — C1769 GUIDE WIRE: HCPCS | Performed by: UROLOGY

## 2024-02-25 PROCEDURE — 6360000004 HC RX CONTRAST MEDICATION: Performed by: UROLOGY

## 2024-02-25 PROCEDURE — 74420 UROGRAPHY RTRGR +-KUB: CPT | Performed by: UROLOGY

## 2024-02-25 PROCEDURE — 36415 COLL VENOUS BLD VENIPUNCTURE: CPT

## 2024-02-25 PROCEDURE — 3600000004 HC SURGERY LEVEL 4 BASE: Performed by: UROLOGY

## 2024-02-25 PROCEDURE — 83036 HEMOGLOBIN GLYCOSYLATED A1C: CPT

## 2024-02-25 PROCEDURE — 3700000000 HC ANESTHESIA ATTENDED CARE: Performed by: UROLOGY

## 2024-02-25 PROCEDURE — 3600000014 HC SURGERY LEVEL 4 ADDTL 15MIN: Performed by: UROLOGY

## 2024-02-25 DEVICE — URETERAL STENT
Type: IMPLANTABLE DEVICE | Site: URETER | Status: FUNCTIONAL
Brand: PERCUFLEX™ PLUS

## 2024-02-25 RX ORDER — HYDROMORPHONE HYDROCHLORIDE 2 MG/ML
0.25 INJECTION, SOLUTION INTRAMUSCULAR; INTRAVENOUS; SUBCUTANEOUS EVERY 5 MIN PRN
Status: DISCONTINUED | OUTPATIENT
Start: 2024-02-25 | End: 2024-02-25 | Stop reason: HOSPADM

## 2024-02-25 RX ORDER — HYDROMORPHONE HYDROCHLORIDE 2 MG/ML
0.5 INJECTION, SOLUTION INTRAMUSCULAR; INTRAVENOUS; SUBCUTANEOUS EVERY 5 MIN PRN
Status: DISCONTINUED | OUTPATIENT
Start: 2024-02-25 | End: 2024-02-25 | Stop reason: HOSPADM

## 2024-02-25 RX ORDER — LIDOCAINE HYDROCHLORIDE 20 MG/ML
INJECTION, SOLUTION EPIDURAL; INFILTRATION; INTRACAUDAL; PERINEURAL PRN
Status: DISCONTINUED | OUTPATIENT
Start: 2024-02-25 | End: 2024-02-25 | Stop reason: SDUPTHER

## 2024-02-25 RX ORDER — EPHEDRINE SULFATE/0.9% NACL/PF 50 MG/5 ML
SYRINGE (ML) INTRAVENOUS PRN
Status: DISCONTINUED | OUTPATIENT
Start: 2024-02-25 | End: 2024-02-25 | Stop reason: SDUPTHER

## 2024-02-25 RX ORDER — ONDANSETRON 2 MG/ML
4 INJECTION INTRAMUSCULAR; INTRAVENOUS
Status: DISCONTINUED | OUTPATIENT
Start: 2024-02-25 | End: 2024-02-25 | Stop reason: HOSPADM

## 2024-02-25 RX ORDER — PROCHLORPERAZINE EDISYLATE 5 MG/ML
5 INJECTION INTRAMUSCULAR; INTRAVENOUS
Status: DISCONTINUED | OUTPATIENT
Start: 2024-02-25 | End: 2024-02-25 | Stop reason: HOSPADM

## 2024-02-25 RX ORDER — ONDANSETRON 2 MG/ML
INJECTION INTRAMUSCULAR; INTRAVENOUS PRN
Status: DISCONTINUED | OUTPATIENT
Start: 2024-02-25 | End: 2024-02-25 | Stop reason: SDUPTHER

## 2024-02-25 RX ORDER — SODIUM CHLORIDE, SODIUM LACTATE, POTASSIUM CHLORIDE, CALCIUM CHLORIDE 600; 310; 30; 20 MG/100ML; MG/100ML; MG/100ML; MG/100ML
INJECTION, SOLUTION INTRAVENOUS CONTINUOUS
Status: DISCONTINUED | OUTPATIENT
Start: 2024-02-25 | End: 2024-02-25 | Stop reason: HOSPADM

## 2024-02-25 RX ORDER — SODIUM CHLORIDE 9 MG/ML
INJECTION, SOLUTION INTRAVENOUS PRN
Status: DISCONTINUED | OUTPATIENT
Start: 2024-02-25 | End: 2024-02-25 | Stop reason: HOSPADM

## 2024-02-25 RX ORDER — LIDOCAINE HYDROCHLORIDE 10 MG/ML
1 INJECTION, SOLUTION INFILTRATION; PERINEURAL
Status: DISCONTINUED | OUTPATIENT
Start: 2024-02-25 | End: 2024-02-25 | Stop reason: HOSPADM

## 2024-02-25 RX ORDER — SODIUM CHLORIDE, SODIUM LACTATE, POTASSIUM CHLORIDE, CALCIUM CHLORIDE 600; 310; 30; 20 MG/100ML; MG/100ML; MG/100ML; MG/100ML
INJECTION, SOLUTION INTRAVENOUS CONTINUOUS PRN
Status: DISCONTINUED | OUTPATIENT
Start: 2024-02-25 | End: 2024-02-25

## 2024-02-25 RX ORDER — MIDAZOLAM HYDROCHLORIDE 2 MG/2ML
2 INJECTION, SOLUTION INTRAMUSCULAR; INTRAVENOUS
Status: DISCONTINUED | OUTPATIENT
Start: 2024-02-25 | End: 2024-02-25 | Stop reason: HOSPADM

## 2024-02-25 RX ORDER — SODIUM CHLORIDE 0.9 % (FLUSH) 0.9 %
5-40 SYRINGE (ML) INJECTION EVERY 12 HOURS SCHEDULED
Status: DISCONTINUED | OUTPATIENT
Start: 2024-02-25 | End: 2024-02-25 | Stop reason: HOSPADM

## 2024-02-25 RX ORDER — DEXAMETHASONE SODIUM PHOSPHATE 4 MG/ML
INJECTION, SOLUTION INTRA-ARTICULAR; INTRALESIONAL; INTRAMUSCULAR; INTRAVENOUS; SOFT TISSUE PRN
Status: DISCONTINUED | OUTPATIENT
Start: 2024-02-25 | End: 2024-02-25 | Stop reason: SDUPTHER

## 2024-02-25 RX ORDER — DIPHENHYDRAMINE HYDROCHLORIDE 50 MG/ML
12.5 INJECTION INTRAMUSCULAR; INTRAVENOUS
Status: DISCONTINUED | OUTPATIENT
Start: 2024-02-25 | End: 2024-02-25 | Stop reason: HOSPADM

## 2024-02-25 RX ORDER — ACETAMINOPHEN 500 MG
1000 TABLET ORAL ONCE
Status: COMPLETED | OUTPATIENT
Start: 2024-02-25 | End: 2024-02-25

## 2024-02-25 RX ORDER — SODIUM CHLORIDE 0.9 % (FLUSH) 0.9 %
5-40 SYRINGE (ML) INJECTION PRN
Status: DISCONTINUED | OUTPATIENT
Start: 2024-02-25 | End: 2024-02-25 | Stop reason: HOSPADM

## 2024-02-25 RX ORDER — FENTANYL CITRATE 50 UG/ML
INJECTION, SOLUTION INTRAMUSCULAR; INTRAVENOUS PRN
Status: DISCONTINUED | OUTPATIENT
Start: 2024-02-25 | End: 2024-02-25 | Stop reason: SDUPTHER

## 2024-02-25 RX ORDER — OXYCODONE HYDROCHLORIDE 5 MG/1
10 TABLET ORAL PRN
Status: DISCONTINUED | OUTPATIENT
Start: 2024-02-25 | End: 2024-02-25 | Stop reason: HOSPADM

## 2024-02-25 RX ORDER — PROPOFOL 10 MG/ML
INJECTION, EMULSION INTRAVENOUS PRN
Status: DISCONTINUED | OUTPATIENT
Start: 2024-02-25 | End: 2024-02-25 | Stop reason: SDUPTHER

## 2024-02-25 RX ORDER — OXYCODONE HYDROCHLORIDE 5 MG/1
5 TABLET ORAL PRN
Status: DISCONTINUED | OUTPATIENT
Start: 2024-02-25 | End: 2024-02-25 | Stop reason: HOSPADM

## 2024-02-25 RX ADMIN — PHENYLEPHRINE HYDROCHLORIDE 100 MCG: 0.1 INJECTION, SOLUTION INTRAVENOUS at 13:00

## 2024-02-25 RX ADMIN — PROPOFOL 30 MG: 10 INJECTION, EMULSION INTRAVENOUS at 13:18

## 2024-02-25 RX ADMIN — SODIUM CHLORIDE, SODIUM LACTATE, POTASSIUM CHLORIDE, AND CALCIUM CHLORIDE: 600; 310; 30; 20 INJECTION, SOLUTION INTRAVENOUS at 11:30

## 2024-02-25 RX ADMIN — PHENYLEPHRINE HYDROCHLORIDE 100 MCG: 0.1 INJECTION, SOLUTION INTRAVENOUS at 13:29

## 2024-02-25 RX ADMIN — Medication 10 MG: at 13:38

## 2024-02-25 RX ADMIN — HYDROMORPHONE HYDROCHLORIDE 0.5 MG: 2 INJECTION, SOLUTION INTRAMUSCULAR; INTRAVENOUS; SUBCUTANEOUS at 14:44

## 2024-02-25 RX ADMIN — Medication 10 MG: at 14:03

## 2024-02-25 RX ADMIN — PHENYLEPHRINE HYDROCHLORIDE 200 MCG: 0.1 INJECTION, SOLUTION INTRAVENOUS at 13:38

## 2024-02-25 RX ADMIN — ACETAMINOPHEN 1000 MG: 500 TABLET ORAL at 11:42

## 2024-02-25 RX ADMIN — SODIUM CHLORIDE, PRESERVATIVE FREE 10 ML: 5 INJECTION INTRAVENOUS at 21:04

## 2024-02-25 RX ADMIN — PHENYLEPHRINE HYDROCHLORIDE 200 MCG: 0.1 INJECTION, SOLUTION INTRAVENOUS at 13:56

## 2024-02-25 RX ADMIN — PHENYLEPHRINE HYDROCHLORIDE 200 MCG: 0.1 INJECTION, SOLUTION INTRAVENOUS at 13:53

## 2024-02-25 RX ADMIN — PROPOFOL 30 MG: 10 INJECTION, EMULSION INTRAVENOUS at 13:21

## 2024-02-25 RX ADMIN — Medication 10 MG: at 13:03

## 2024-02-25 RX ADMIN — PHENYLEPHRINE HYDROCHLORIDE 100 MCG: 0.1 INJECTION, SOLUTION INTRAVENOUS at 13:35

## 2024-02-25 RX ADMIN — Medication 10 MG: at 13:59

## 2024-02-25 RX ADMIN — PROPOFOL 50 MG: 10 INJECTION, EMULSION INTRAVENOUS at 14:02

## 2024-02-25 RX ADMIN — ONDANSETRON 4 MG: 2 INJECTION INTRAMUSCULAR; INTRAVENOUS at 13:00

## 2024-02-25 RX ADMIN — FENTANYL CITRATE 25 MCG: 50 INJECTION, SOLUTION INTRAMUSCULAR; INTRAVENOUS at 13:00

## 2024-02-25 RX ADMIN — DEXAMETHASONE SODIUM PHOSPHATE 4 MG: 4 INJECTION INTRA-ARTICULAR; INTRALESIONAL; INTRAMUSCULAR; INTRAVENOUS; SOFT TISSUE at 13:00

## 2024-02-25 RX ADMIN — PHENYLEPHRINE HYDROCHLORIDE 50 MCG: 0.1 INJECTION, SOLUTION INTRAVENOUS at 13:15

## 2024-02-25 RX ADMIN — FENTANYL CITRATE 50 MCG: 50 INJECTION, SOLUTION INTRAMUSCULAR; INTRAVENOUS at 13:42

## 2024-02-25 RX ADMIN — PROPOFOL 220 MG: 10 INJECTION, EMULSION INTRAVENOUS at 12:53

## 2024-02-25 RX ADMIN — HYOSCYAMINE SULFATE 125 MCG: 0.12 TABLET ORAL; SUBLINGUAL at 07:50

## 2024-02-25 RX ADMIN — PHENYLEPHRINE HYDROCHLORIDE 150 MCG: 0.1 INJECTION, SOLUTION INTRAVENOUS at 13:02

## 2024-02-25 RX ADMIN — Medication 10 MG: at 13:32

## 2024-02-25 RX ADMIN — Medication 2 G: at 13:13

## 2024-02-25 RX ADMIN — FENTANYL CITRATE 25 MCG: 50 INJECTION, SOLUTION INTRAMUSCULAR; INTRAVENOUS at 13:01

## 2024-02-25 RX ADMIN — SODIUM CHLORIDE: 9 INJECTION, SOLUTION INTRAVENOUS at 17:29

## 2024-02-25 RX ADMIN — SODIUM CHLORIDE, SODIUM LACTATE, POTASSIUM CHLORIDE, AND CALCIUM CHLORIDE: 600; 310; 30; 20 INJECTION, SOLUTION INTRAVENOUS at 13:19

## 2024-02-25 RX ADMIN — PROPOFOL 30 MG: 10 INJECTION, EMULSION INTRAVENOUS at 13:43

## 2024-02-25 RX ADMIN — FENTANYL CITRATE 50 MCG: 50 INJECTION, SOLUTION INTRAMUSCULAR; INTRAVENOUS at 13:17

## 2024-02-25 RX ADMIN — Medication 5 MG: at 13:29

## 2024-02-25 RX ADMIN — FENTANYL CITRATE 50 MCG: 50 INJECTION, SOLUTION INTRAMUSCULAR; INTRAVENOUS at 13:22

## 2024-02-25 RX ADMIN — Medication 5 MG: at 13:15

## 2024-02-25 RX ADMIN — LIDOCAINE HYDROCHLORIDE 100 MG: 20 INJECTION, SOLUTION EPIDURAL; INFILTRATION; INTRACAUDAL; PERINEURAL at 12:53

## 2024-02-25 RX ADMIN — SODIUM CHLORIDE, PRESERVATIVE FREE 10 ML: 5 INJECTION INTRAVENOUS at 11:04

## 2024-02-25 RX ADMIN — HYOSCYAMINE SULFATE 125 MCG: 0.12 TABLET ORAL; SUBLINGUAL at 17:08

## 2024-02-25 ASSESSMENT — PAIN DESCRIPTION - DESCRIPTORS: DESCRIPTORS: SPASM

## 2024-02-25 ASSESSMENT — PAIN SCALES - GENERAL
PAINLEVEL_OUTOF10: 0
PAINLEVEL_OUTOF10: 4
PAINLEVEL_OUTOF10: 0
PAINLEVEL_OUTOF10: 0

## 2024-02-25 ASSESSMENT — PAIN DESCRIPTION - LOCATION: LOCATION: OTHER (COMMENT)

## 2024-02-25 NOTE — PERIOP NOTE
TRANSFER - OUT REPORT:    Verbal report given to DAY Nguyen on Saulo Ya  being transferred to [facility] for routine post-op       Report consisted of patient's Situation, Background, Assessment and   Recommendations(SBAR).     Information from the following report(s) Nurse Handoff Report, Index, Surgery Report, Intake/Output, and Event Log was reviewed with the receiving nurse.           Lines:   Peripheral IV 02/22/24 Left Antecubital (Active)   Site Assessment Clean, dry & intact 02/25/24 1128   Line Status Flushed;Infusing 02/25/24 1128   Line Care Connections checked and tightened 02/25/24 1128   Phlebitis Assessment No symptoms 02/25/24 1128   Infiltration Assessment 0 02/25/24 1128   Alcohol Cap Used No 02/25/24 1128   Dressing Status Clean, dry & intact 02/25/24 1128   Dressing Type Transparent 02/25/24 1128   Dressing Intervention New 02/22/24 1342       Peripheral IV 02/22/24 Right Antecubital (Active)   Site Assessment Draining 02/25/24 1128   Line Status Capped 02/25/24 1128   Line Care Connections checked and tightened 02/25/24 1128   Phlebitis Assessment No symptoms 02/25/24 1128   Infiltration Assessment 0 02/25/24 1128   Alcohol Cap Used No 02/25/24 1128   Dressing Status Old drainage noted 02/25/24 1128   Dressing Type Transparent 02/25/24 1128        Opportunity for questions and clarification was provided.      Patient transported with:  Tech

## 2024-02-25 NOTE — PERIOP NOTE
TRANSFER - IN REPORT:    Verbal report received from DAY Nguyen on Saulo Felton being received from 810 for ordered procedure      Report consisted of patient’s Situation, Background, Assessment and Recommendations(SBAR).     Information from the following report(s) SBAR, Kardex, MAR, Recent Results, Procedure Verification, and Quality Measures was reviewed with the receiving nurse.    Opportunity for questions and clarification was provided.      Assessment completed upon patient’s arrival to unit and care assumed.

## 2024-02-25 NOTE — PLAN OF CARE
Assessment completed as noted.  Pt returned from PACU this afternoon.  Payne patent, draining cherry colored urine.  C/O bladder spasms; relief with hycosamine prn.  Appetite good.  No s/s of distress noted. Will con't to monitor and assess.      Problem: Discharge Planning  Goal: Discharge to home or other facility with appropriate resources  Outcome: Progressing  Flowsheets (Taken 2/25/2024 4325)  Discharge to home or other facility with appropriate resources:   Identify barriers to discharge with patient and caregiver   Refer to discharge planning if patient needs post-hospital services based on physician order or complex needs related to functional status, cognitive ability or social support system     Problem: Safety - Adult  Goal: Free from fall injury  Outcome: Progressing     Problem: Pain  Goal: Verbalizes/displays adequate comfort level or baseline comfort level  Outcome: Progressing

## 2024-02-26 LAB
ABO + RH BLD: NORMAL
ANION GAP SERPL CALC-SCNC: 14 MMOL/L (ref 2–11)
BASOPHILS # BLD: 0 K/UL (ref 0–0.2)
BASOPHILS NFR BLD: 0 % (ref 0–2)
BLD PROD TYP BPU: NORMAL
BLOOD BANK DISPENSE STATUS: NORMAL
BLOOD GROUP ANTIBODIES SERPL: NORMAL
BPU ID: NORMAL
BUN SERPL-MCNC: 166 MG/DL (ref 6–23)
CALCIUM SERPL-MCNC: 7.7 MG/DL (ref 8.3–10.4)
CHLORIDE SERPL-SCNC: 108 MMOL/L (ref 103–113)
CO2 SERPL-SCNC: 21 MMOL/L (ref 21–32)
CREAT SERPL-MCNC: 19 MG/DL (ref 0.8–1.5)
CROSSMATCH RESULT: NORMAL
DIFFERENTIAL METHOD BLD: ABNORMAL
EOSINOPHIL # BLD: 0 K/UL (ref 0–0.8)
EOSINOPHIL NFR BLD: 0 % (ref 0.5–7.8)
ERYTHROCYTE [DISTWIDTH] IN BLOOD BY AUTOMATED COUNT: 12.4 % (ref 11.9–14.6)
GLUCOSE SERPL-MCNC: 114 MG/DL (ref 65–100)
HCT VFR BLD AUTO: 24 % (ref 41.1–50.3)
HGB BLD-MCNC: 7.9 G/DL (ref 13.6–17.2)
IMM GRANULOCYTES # BLD AUTO: 0 K/UL (ref 0–0.5)
IMM GRANULOCYTES NFR BLD AUTO: 1 % (ref 0–5)
LYMPHOCYTES # BLD: 0.3 K/UL (ref 0.5–4.6)
LYMPHOCYTES NFR BLD: 3 % (ref 13–44)
MAGNESIUM SERPL-MCNC: 2.5 MG/DL (ref 1.8–2.4)
MCH RBC QN AUTO: 30.6 PG (ref 26.1–32.9)
MCHC RBC AUTO-ENTMCNC: 32.9 G/DL (ref 31.4–35)
MCV RBC AUTO: 93 FL (ref 82–102)
MM INDURATION, POC: 0 MM (ref 0–5)
MONOCYTES # BLD: 0.5 K/UL (ref 0.1–1.3)
MONOCYTES NFR BLD: 6 % (ref 4–12)
NEUTS SEG # BLD: 7 K/UL (ref 1.7–8.2)
NEUTS SEG NFR BLD: 90 % (ref 43–78)
NRBC # BLD: 0 K/UL (ref 0–0.2)
PLATELET # BLD AUTO: 182 K/UL (ref 150–450)
PMV BLD AUTO: 9 FL (ref 9.4–12.3)
POTASSIUM SERPL-SCNC: 4.1 MMOL/L (ref 3.5–5.1)
PPD, POC: NEGATIVE
RBC # BLD AUTO: 2.58 M/UL (ref 4.23–5.6)
SODIUM SERPL-SCNC: 143 MMOL/L (ref 136–146)
SPECIMEN EXP DATE BLD: NORMAL
UNIT DIVISION: 0
WBC # BLD AUTO: 7.9 K/UL (ref 4.3–11.1)

## 2024-02-26 PROCEDURE — 36415 COLL VENOUS BLD VENIPUNCTURE: CPT

## 2024-02-26 PROCEDURE — 80048 BASIC METABOLIC PNL TOTAL CA: CPT

## 2024-02-26 PROCEDURE — 83735 ASSAY OF MAGNESIUM: CPT

## 2024-02-26 PROCEDURE — 99231 SBSQ HOSP IP/OBS SF/LOW 25: CPT | Performed by: NURSE PRACTITIONER

## 2024-02-26 PROCEDURE — 85025 COMPLETE CBC W/AUTO DIFF WBC: CPT

## 2024-02-26 PROCEDURE — 2580000003 HC RX 258: Performed by: INTERNAL MEDICINE

## 2024-02-26 PROCEDURE — 1100000003 HC PRIVATE W/ TELEMETRY

## 2024-02-26 PROCEDURE — 2580000003 HC RX 258: Performed by: STUDENT IN AN ORGANIZED HEALTH CARE EDUCATION/TRAINING PROGRAM

## 2024-02-26 PROCEDURE — 6370000000 HC RX 637 (ALT 250 FOR IP): Performed by: NURSE PRACTITIONER

## 2024-02-26 RX ADMIN — HYOSCYAMINE SULFATE 125 MCG: 0.12 TABLET ORAL; SUBLINGUAL at 01:55

## 2024-02-26 RX ADMIN — SODIUM CHLORIDE, PRESERVATIVE FREE 10 ML: 5 INJECTION INTRAVENOUS at 19:40

## 2024-02-26 RX ADMIN — SODIUM CHLORIDE, PRESERVATIVE FREE 10 ML: 5 INJECTION INTRAVENOUS at 08:56

## 2024-02-26 RX ADMIN — HYOSCYAMINE SULFATE 125 MCG: 0.12 TABLET ORAL; SUBLINGUAL at 22:17

## 2024-02-26 RX ADMIN — HYOSCYAMINE SULFATE 125 MCG: 0.12 TABLET ORAL; SUBLINGUAL at 17:44

## 2024-02-26 RX ADMIN — SODIUM CHLORIDE: 9 INJECTION, SOLUTION INTRAVENOUS at 04:16

## 2024-02-26 RX ADMIN — HYOSCYAMINE SULFATE 125 MCG: 0.12 TABLET ORAL; SUBLINGUAL at 13:59

## 2024-02-26 RX ADMIN — HYOSCYAMINE SULFATE 125 MCG: 0.12 TABLET ORAL; SUBLINGUAL at 08:45

## 2024-02-26 RX ADMIN — SODIUM CHLORIDE: 9 INJECTION, SOLUTION INTRAVENOUS at 22:20

## 2024-02-26 ASSESSMENT — PAIN DESCRIPTION - DESCRIPTORS
DESCRIPTORS: SPASM
DESCRIPTORS: ACHING
DESCRIPTORS: THROBBING;CRAMPING;ACHING

## 2024-02-26 ASSESSMENT — PAIN DESCRIPTION - ORIENTATION
ORIENTATION: MID
ORIENTATION: ANTERIOR;MID

## 2024-02-26 ASSESSMENT — PAIN SCALES - GENERAL
PAINLEVEL_OUTOF10: 5
PAINLEVEL_OUTOF10: 3
PAINLEVEL_OUTOF10: 3
PAINLEVEL_OUTOF10: 5
PAINLEVEL_OUTOF10: 3

## 2024-02-26 ASSESSMENT — PAIN DESCRIPTION - LOCATION
LOCATION: GROIN
LOCATION: GROIN

## 2024-02-26 NOTE — CARE COORDINATION
MSN, CM:  patient with BUN at 166 and Creatinine at 19 this AM.  Hgb at 7.9 this AM also.  Patient for ureteral stent placement today.  Case Management will continue to follow for possible HD and any other discharge needs.

## 2024-02-26 NOTE — OP NOTE
it would be best to progress to a rigid ureteroscope to try to place the wire in the renal pelvis in order to get a stent all the way up the ureter.  I therefore switched to a pressure bag and my rigid ureteroscope.  I inserted this alongside the wire on the left at the left ureter and using a second Sensor wire I was able to eventually navigate it into the proximal left ureter, straighten out the ureter enough that I was able to get the wire all the way up into the renal pelvis.  I confirmed placement again with retrograde.  Once this wire was confirmed in the renal pelvis, the other wire was removed and the ureteroscope was carefully backed down the ureter.  The ureter otherwise appeared normal, the size being hydronephrotic.  No concerning masses were noted.  He did have significant kinking just from the tortuosity of the ureter, likely from longstanding bladder outlet obstruction.  I then turned my attention to the right ureteral orifice and passed my ureteroscope alongside the right ureteral orifice in the exact same fashion, using a second Sensor wire, and I was able to eventually again navigate the wire into the right renal pelvis using the semi rigid scope, and then back-loading the scope off this wire and removing the original wire.  Now that I had two good wires both in the renal pelvis, confirmed with ureteroscopy and retrograde, I loaded them onto the rigid cystoscope and passed bilateral ureteral stents 6 x 28 up the wires into the bilateral renal pelvises.  Once they were in position, I pulled the wires noting good curls in the renal pelvises as well as the bladder.  I then placed a 16-Norwegian Payne catheter to maximally drain the bladder and inflated the balloon with 10 mL of sterile water.  The catheter was left to drainage.      The patient was then awoken from anesthesia, transferred to the PACU in stable condition.      He tolerated the procedure well.      There were no complications.      All

## 2024-02-27 ENCOUNTER — APPOINTMENT (OUTPATIENT)
Dept: ULTRASOUND IMAGING | Age: 54
DRG: 660 | End: 2024-02-27
Attending: STUDENT IN AN ORGANIZED HEALTH CARE EDUCATION/TRAINING PROGRAM
Payer: COMMERCIAL

## 2024-02-27 ENCOUNTER — APPOINTMENT (OUTPATIENT)
Dept: NON INVASIVE DIAGNOSTICS | Age: 54
DRG: 660 | End: 2024-02-27
Attending: INTERNAL MEDICINE
Payer: COMMERCIAL

## 2024-02-27 LAB
ANION GAP SERPL CALC-SCNC: 15 MMOL/L (ref 2–11)
BASOPHILS # BLD: 0 K/UL (ref 0–0.2)
BASOPHILS NFR BLD: 0 % (ref 0–2)
BUN SERPL-MCNC: 161 MG/DL (ref 6–23)
CALCIUM SERPL-MCNC: 7.5 MG/DL (ref 8.3–10.4)
CHLORIDE SERPL-SCNC: 111 MMOL/L (ref 103–113)
CO2 SERPL-SCNC: 18 MMOL/L (ref 21–32)
CREAT SERPL-MCNC: 18.6 MG/DL (ref 0.8–1.5)
DIFFERENTIAL METHOD BLD: ABNORMAL
ECHO AO ASC DIAM: 3.7 CM
ECHO AO ASCENDING AORTA INDEX: 1.84 CM/M2
ECHO AO ROOT DIAM: 3.3 CM
ECHO AO ROOT INDEX: 1.64 CM/M2
ECHO AV AREA PEAK VELOCITY: 3.4 CM2
ECHO AV AREA VTI: 3.4 CM2
ECHO AV AREA/BSA PEAK VELOCITY: 1.7 CM2/M2
ECHO AV AREA/BSA VTI: 1.7 CM2/M2
ECHO AV MEAN GRADIENT: 6 MMHG
ECHO AV MEAN VELOCITY: 1.2 M/S
ECHO AV PEAK GRADIENT: 12 MMHG
ECHO AV PEAK VELOCITY: 1.7 M/S
ECHO AV VELOCITY RATIO: 0.76
ECHO AV VTI: 34.2 CM
ECHO BSA: 1.99 M2
ECHO EST RA PRESSURE: 3 MMHG
ECHO IVC PROX: 1.6 CM
ECHO LA AREA 2C: 16.9 CM2
ECHO LA AREA 4C: 21.3 CM2
ECHO LA DIAMETER INDEX: 1.94 CM/M2
ECHO LA DIAMETER: 3.9 CM
ECHO LA MAJOR AXIS: 6.1 CM
ECHO LA MINOR AXIS: 4.9 CM
ECHO LA TO AORTIC ROOT RATIO: 1.18
ECHO LA VOL BP: 58 ML (ref 18–58)
ECHO LA VOL MOD A2C: 46 ML (ref 18–58)
ECHO LA VOL MOD A4C: 59 ML (ref 18–58)
ECHO LA VOL/BSA BIPLANE: 29 ML/M2 (ref 16–34)
ECHO LA VOLUME INDEX MOD A2C: 23 ML/M2 (ref 16–34)
ECHO LA VOLUME INDEX MOD A4C: 29 ML/M2 (ref 16–34)
ECHO LV E' LATERAL VELOCITY: 11 CM/S
ECHO LV E' SEPTAL VELOCITY: 7 CM/S
ECHO LV EDV A2C: 120 ML
ECHO LV EDV A4C: 129 ML
ECHO LV EDV INDEX A4C: 64 ML/M2
ECHO LV EDV NDEX A2C: 60 ML/M2
ECHO LV EJECTION FRACTION A2C: 62 %
ECHO LV EJECTION FRACTION A4C: 58 %
ECHO LV EJECTION FRACTION BIPLANE: 58 % (ref 55–100)
ECHO LV ESV A2C: 46 ML
ECHO LV ESV A4C: 54 ML
ECHO LV ESV INDEX A2C: 23 ML/M2
ECHO LV ESV INDEX A4C: 27 ML/M2
ECHO LV FRACTIONAL SHORTENING: 33 % (ref 28–44)
ECHO LV INTERNAL DIMENSION DIASTOLE INDEX: 3.03 CM/M2
ECHO LV INTERNAL DIMENSION DIASTOLIC: 6.1 CM (ref 4.2–5.9)
ECHO LV INTERNAL DIMENSION SYSTOLIC INDEX: 2.04 CM/M2
ECHO LV INTERNAL DIMENSION SYSTOLIC: 4.1 CM
ECHO LV IVSD: 0.6 CM (ref 0.6–1)
ECHO LV MASS 2D: 148.9 G (ref 88–224)
ECHO LV MASS INDEX 2D: 74.1 G/M2 (ref 49–115)
ECHO LV POSTERIOR WALL DIASTOLIC: 0.7 CM (ref 0.6–1)
ECHO LV RELATIVE WALL THICKNESS RATIO: 0.23
ECHO LVOT AREA: 4.5 CM2
ECHO LVOT AV VTI INDEX: 0.76
ECHO LVOT DIAM: 2.4 CM
ECHO LVOT MEAN GRADIENT: 4 MMHG
ECHO LVOT PEAK GRADIENT: 7 MMHG
ECHO LVOT PEAK VELOCITY: 1.3 M/S
ECHO LVOT STROKE VOLUME INDEX: 58.3 ML/M2
ECHO LVOT SV: 117.1 ML
ECHO LVOT VTI: 25.9 CM
ECHO MV A VELOCITY: 0.89 M/S
ECHO MV E DECELERATION TIME (DT): 222 MS
ECHO MV E VELOCITY: 0.86 M/S
ECHO MV E/A RATIO: 0.97
ECHO MV E/E' LATERAL: 7.82
ECHO MV E/E' RATIO (AVERAGED): 10.05
ECHO PV ACCELERATION TIME (AT): 129 MS
ECHO PV MAX VELOCITY: 1.3 M/S
ECHO PV PEAK GRADIENT: 7 MMHG
ECHO RIGHT VENTRICULAR SYSTOLIC PRESSURE (RVSP): 27 MMHG
ECHO RV BASAL DIMENSION: 3.5 CM
ECHO RV FREE WALL PEAK S': 13 CM/S
ECHO TV REGURGITANT MAX VELOCITY: 2.47 M/S
ECHO TV REGURGITANT PEAK GRADIENT: 24 MMHG
EOSINOPHIL # BLD: 0.4 K/UL (ref 0–0.8)
EOSINOPHIL NFR BLD: 5 % (ref 0.5–7.8)
ERYTHROCYTE [DISTWIDTH] IN BLOOD BY AUTOMATED COUNT: 12.5 % (ref 11.9–14.6)
GLUCOSE SERPL-MCNC: 95 MG/DL (ref 65–100)
HCT VFR BLD AUTO: 23.3 % (ref 41.1–50.3)
HGB BLD-MCNC: 7.5 G/DL (ref 13.6–17.2)
IMM GRANULOCYTES # BLD AUTO: 0 K/UL (ref 0–0.5)
IMM GRANULOCYTES NFR BLD AUTO: 0 % (ref 0–5)
LYMPHOCYTES # BLD: 0.5 K/UL (ref 0.5–4.6)
LYMPHOCYTES NFR BLD: 7 % (ref 13–44)
MCH RBC QN AUTO: 30.5 PG (ref 26.1–32.9)
MCHC RBC AUTO-ENTMCNC: 32.2 G/DL (ref 31.4–35)
MCV RBC AUTO: 94.7 FL (ref 82–102)
MONOCYTES # BLD: 0.6 K/UL (ref 0.1–1.3)
MONOCYTES NFR BLD: 8 % (ref 4–12)
NEUTS SEG # BLD: 5.6 K/UL (ref 1.7–8.2)
NEUTS SEG NFR BLD: 80 % (ref 43–78)
NRBC # BLD: 0 K/UL (ref 0–0.2)
PLATELET # BLD AUTO: 183 K/UL (ref 150–450)
PMV BLD AUTO: 9 FL (ref 9.4–12.3)
POTASSIUM SERPL-SCNC: 4.3 MMOL/L (ref 3.5–5.1)
RBC # BLD AUTO: 2.46 M/UL (ref 4.23–5.6)
SODIUM SERPL-SCNC: 144 MMOL/L (ref 136–146)
WBC # BLD AUTO: 7.1 K/UL (ref 4.3–11.1)

## 2024-02-27 PROCEDURE — 2580000003 HC RX 258: Performed by: STUDENT IN AN ORGANIZED HEALTH CARE EDUCATION/TRAINING PROGRAM

## 2024-02-27 PROCEDURE — 76770 US EXAM ABDO BACK WALL COMP: CPT

## 2024-02-27 PROCEDURE — 6370000000 HC RX 637 (ALT 250 FOR IP): Performed by: NURSE PRACTITIONER

## 2024-02-27 PROCEDURE — 85025 COMPLETE CBC W/AUTO DIFF WBC: CPT

## 2024-02-27 PROCEDURE — 99232 SBSQ HOSP IP/OBS MODERATE 35: CPT | Performed by: UROLOGY

## 2024-02-27 PROCEDURE — 80048 BASIC METABOLIC PNL TOTAL CA: CPT

## 2024-02-27 PROCEDURE — 93306 TTE W/DOPPLER COMPLETE: CPT

## 2024-02-27 PROCEDURE — 36415 COLL VENOUS BLD VENIPUNCTURE: CPT

## 2024-02-27 PROCEDURE — 6370000000 HC RX 637 (ALT 250 FOR IP): Performed by: INTERNAL MEDICINE

## 2024-02-27 PROCEDURE — 1100000000 HC RM PRIVATE

## 2024-02-27 RX ORDER — DOCUSATE SODIUM 100 MG/1
100 CAPSULE, LIQUID FILLED ORAL 2 TIMES DAILY
Status: DISCONTINUED | OUTPATIENT
Start: 2024-02-27 | End: 2024-03-05 | Stop reason: HOSPADM

## 2024-02-27 RX ADMIN — HYOSCYAMINE SULFATE 125 MCG: 0.12 TABLET ORAL; SUBLINGUAL at 20:13

## 2024-02-27 RX ADMIN — HYOSCYAMINE SULFATE 125 MCG: 0.12 TABLET ORAL; SUBLINGUAL at 15:11

## 2024-02-27 RX ADMIN — SODIUM CHLORIDE, PRESERVATIVE FREE 10 ML: 5 INJECTION INTRAVENOUS at 20:15

## 2024-02-27 RX ADMIN — HYOSCYAMINE SULFATE 125 MCG: 0.12 TABLET ORAL; SUBLINGUAL at 06:16

## 2024-02-27 RX ADMIN — SODIUM CHLORIDE, PRESERVATIVE FREE 10 ML: 5 INJECTION INTRAVENOUS at 09:01

## 2024-02-27 RX ADMIN — DOCUSATE SODIUM 100 MG: 100 CAPSULE, LIQUID FILLED ORAL at 15:08

## 2024-02-27 ASSESSMENT — PAIN DESCRIPTION - LOCATION: LOCATION: OTHER (COMMENT)

## 2024-02-27 ASSESSMENT — PAIN SCALES - GENERAL
PAINLEVEL_OUTOF10: 0
PAINLEVEL_OUTOF10: 0
PAINLEVEL_OUTOF10: 6
PAINLEVEL_OUTOF10: 3

## 2024-02-27 ASSESSMENT — PAIN SCALES - WONG BAKER
WONGBAKER_NUMERICALRESPONSE: 0
WONGBAKER_NUMERICALRESPONSE: 0

## 2024-02-27 ASSESSMENT — PAIN - FUNCTIONAL ASSESSMENT: PAIN_FUNCTIONAL_ASSESSMENT: ACTIVITIES ARE NOT PREVENTED

## 2024-02-27 ASSESSMENT — PAIN DESCRIPTION - DESCRIPTORS: DESCRIPTORS: CRAMPING;SPASM

## 2024-02-28 ENCOUNTER — APPOINTMENT (OUTPATIENT)
Dept: INTERVENTIONAL RADIOLOGY/VASCULAR | Age: 54
DRG: 660 | End: 2024-02-28
Attending: STUDENT IN AN ORGANIZED HEALTH CARE EDUCATION/TRAINING PROGRAM
Payer: COMMERCIAL

## 2024-02-28 PROBLEM — Z99.2 ACUTE RENAL FAILURE ON DIALYSIS (HCC): Status: ACTIVE | Noted: 2024-02-28

## 2024-02-28 PROBLEM — N17.9 ACUTE RENAL FAILURE ON DIALYSIS (HCC): Status: ACTIVE | Noted: 2024-02-28

## 2024-02-28 LAB
ANION GAP SERPL CALC-SCNC: 14 MMOL/L (ref 2–11)
BASOPHILS # BLD: 0.1 K/UL (ref 0–0.2)
BASOPHILS NFR BLD: 1 % (ref 0–2)
BUN SERPL-MCNC: 162 MG/DL (ref 6–23)
CALCIUM SERPL-MCNC: 7.5 MG/DL (ref 8.3–10.4)
CHLORIDE SERPL-SCNC: 112 MMOL/L (ref 103–113)
CO2 SERPL-SCNC: 18 MMOL/L (ref 21–32)
CREAT SERPL-MCNC: 18.2 MG/DL (ref 0.8–1.5)
DIFFERENTIAL METHOD BLD: ABNORMAL
EOSINOPHIL # BLD: 0.4 K/UL (ref 0–0.8)
EOSINOPHIL NFR BLD: 6 % (ref 0.5–7.8)
ERYTHROCYTE [DISTWIDTH] IN BLOOD BY AUTOMATED COUNT: 12.5 % (ref 11.9–14.6)
GLUCOSE SERPL-MCNC: 91 MG/DL (ref 65–100)
HAV IGM SER QL: NONREACTIVE
HBV CORE IGM SER QL: NONREACTIVE
HBV SURFACE AB SERPL IA-ACNC: <3.1 MIU/ML
HBV SURFACE AG SER QL: NONREACTIVE
HCT VFR BLD AUTO: 23.2 % (ref 41.1–50.3)
HCV AB SER QL: NONREACTIVE
HGB BLD-MCNC: 7.4 G/DL (ref 13.6–17.2)
IMM GRANULOCYTES # BLD AUTO: 0 K/UL (ref 0–0.5)
IMM GRANULOCYTES NFR BLD AUTO: 1 % (ref 0–5)
LYMPHOCYTES # BLD: 0.5 K/UL (ref 0.5–4.6)
LYMPHOCYTES NFR BLD: 8 % (ref 13–44)
MCH RBC QN AUTO: 30.6 PG (ref 26.1–32.9)
MCHC RBC AUTO-ENTMCNC: 31.9 G/DL (ref 31.4–35)
MCV RBC AUTO: 95.9 FL (ref 82–102)
MONOCYTES # BLD: 0.7 K/UL (ref 0.1–1.3)
MONOCYTES NFR BLD: 10 % (ref 4–12)
NEUTS SEG # BLD: 5 K/UL (ref 1.7–8.2)
NEUTS SEG NFR BLD: 74 % (ref 43–78)
NRBC # BLD: 0 K/UL (ref 0–0.2)
PLATELET # BLD AUTO: 182 K/UL (ref 150–450)
PMV BLD AUTO: 9.1 FL (ref 9.4–12.3)
POTASSIUM SERPL-SCNC: 4.4 MMOL/L (ref 3.5–5.1)
RBC # BLD AUTO: 2.42 M/UL (ref 4.23–5.6)
SODIUM SERPL-SCNC: 144 MMOL/L (ref 136–146)
WBC # BLD AUTO: 6.6 K/UL (ref 4.3–11.1)

## 2024-02-28 PROCEDURE — 0JH63XZ INSERTION OF TUNNELED VASCULAR ACCESS DEVICE INTO CHEST SUBCUTANEOUS TISSUE AND FASCIA, PERCUTANEOUS APPROACH: ICD-10-PCS | Performed by: RADIOLOGY

## 2024-02-28 PROCEDURE — 1100000000 HC RM PRIVATE

## 2024-02-28 PROCEDURE — 99153 MOD SED SAME PHYS/QHP EA: CPT

## 2024-02-28 PROCEDURE — 02H633Z INSERTION OF INFUSION DEVICE INTO RIGHT ATRIUM, PERCUTANEOUS APPROACH: ICD-10-PCS | Performed by: RADIOLOGY

## 2024-02-28 PROCEDURE — 77001 FLUOROGUIDE FOR VEIN DEVICE: CPT | Performed by: RADIOLOGY

## 2024-02-28 PROCEDURE — 76937 US GUIDE VASCULAR ACCESS: CPT | Performed by: RADIOLOGY

## 2024-02-28 PROCEDURE — 6360000002 HC RX W HCPCS: Performed by: RADIOLOGY

## 2024-02-28 PROCEDURE — 36558 INSERT TUNNELED CV CATH: CPT

## 2024-02-28 PROCEDURE — 2580000003 HC RX 258: Performed by: FAMILY MEDICINE

## 2024-02-28 PROCEDURE — 6370000000 HC RX 637 (ALT 250 FOR IP): Performed by: FAMILY MEDICINE

## 2024-02-28 PROCEDURE — 85025 COMPLETE CBC W/AUTO DIFF WBC: CPT

## 2024-02-28 PROCEDURE — 6370000000 HC RX 637 (ALT 250 FOR IP): Performed by: NURSE PRACTITIONER

## 2024-02-28 PROCEDURE — 5A1D70Z PERFORMANCE OF URINARY FILTRATION, INTERMITTENT, LESS THAN 6 HOURS PER DAY: ICD-10-PCS | Performed by: INTERNAL MEDICINE

## 2024-02-28 PROCEDURE — 99231 SBSQ HOSP IP/OBS SF/LOW 25: CPT | Performed by: UROLOGY

## 2024-02-28 PROCEDURE — 86704 HEP B CORE ANTIBODY TOTAL: CPT

## 2024-02-28 PROCEDURE — 86706 HEP B SURFACE ANTIBODY: CPT

## 2024-02-28 PROCEDURE — 6370000000 HC RX 637 (ALT 250 FOR IP): Performed by: INTERNAL MEDICINE

## 2024-02-28 PROCEDURE — 80074 ACUTE HEPATITIS PANEL: CPT

## 2024-02-28 PROCEDURE — 80048 BASIC METABOLIC PNL TOTAL CA: CPT

## 2024-02-28 PROCEDURE — 36558 INSERT TUNNELED CV CATH: CPT | Performed by: RADIOLOGY

## 2024-02-28 PROCEDURE — 90935 HEMODIALYSIS ONE EVALUATION: CPT

## 2024-02-28 PROCEDURE — 2580000003 HC RX 258: Performed by: STUDENT IN AN ORGANIZED HEALTH CARE EDUCATION/TRAINING PROGRAM

## 2024-02-28 PROCEDURE — 99152 MOD SED SAME PHYS/QHP 5/>YRS: CPT | Performed by: RADIOLOGY

## 2024-02-28 PROCEDURE — 36415 COLL VENOUS BLD VENIPUNCTURE: CPT

## 2024-02-28 PROCEDURE — 2500000003 HC RX 250 WO HCPCS: Performed by: RADIOLOGY

## 2024-02-28 RX ORDER — POLYETHYLENE GLYCOL 3350 17 G/17G
17 POWDER, FOR SOLUTION ORAL DAILY
Status: DISCONTINUED | OUTPATIENT
Start: 2024-02-28 | End: 2024-03-05 | Stop reason: HOSPADM

## 2024-02-28 RX ORDER — MIDAZOLAM HYDROCHLORIDE 2 MG/2ML
INJECTION, SOLUTION INTRAMUSCULAR; INTRAVENOUS PRN
Status: COMPLETED | OUTPATIENT
Start: 2024-02-28 | End: 2024-02-28

## 2024-02-28 RX ORDER — FENTANYL CITRATE 50 UG/ML
INJECTION, SOLUTION INTRAMUSCULAR; INTRAVENOUS PRN
Status: COMPLETED | OUTPATIENT
Start: 2024-02-28 | End: 2024-02-28

## 2024-02-28 RX ORDER — SODIUM CHLORIDE, SODIUM LACTATE, POTASSIUM CHLORIDE, AND CALCIUM CHLORIDE .6; .31; .03; .02 G/100ML; G/100ML; G/100ML; G/100ML
250 INJECTION, SOLUTION INTRAVENOUS ONCE
Status: COMPLETED | OUTPATIENT
Start: 2024-02-28 | End: 2024-02-28

## 2024-02-28 RX ORDER — HEPARIN SODIUM 1000 [USP'U]/ML
INJECTION, SOLUTION INTRAVENOUS; SUBCUTANEOUS PRN
Status: COMPLETED | OUTPATIENT
Start: 2024-02-28 | End: 2024-02-28

## 2024-02-28 RX ORDER — LIDOCAINE HYDROCHLORIDE 20 MG/ML
INJECTION, SOLUTION INFILTRATION; PERINEURAL PRN
Status: COMPLETED | OUTPATIENT
Start: 2024-02-28 | End: 2024-02-28

## 2024-02-28 RX ORDER — SODIUM CHLORIDE, SODIUM LACTATE, POTASSIUM CHLORIDE, AND CALCIUM CHLORIDE .6; .31; .03; .02 G/100ML; G/100ML; G/100ML; G/100ML
500 INJECTION, SOLUTION INTRAVENOUS ONCE
Status: DISCONTINUED | OUTPATIENT
Start: 2024-02-28 | End: 2024-02-28

## 2024-02-28 RX ORDER — LANOLIN ALCOHOL/MO/W.PET/CERES
3 CREAM (GRAM) TOPICAL NIGHTLY PRN
Status: DISCONTINUED | OUTPATIENT
Start: 2024-02-28 | End: 2024-03-05 | Stop reason: HOSPADM

## 2024-02-28 RX ADMIN — MIDAZOLAM HYDROCHLORIDE 1 MG: 1 INJECTION, SOLUTION INTRAMUSCULAR; INTRAVENOUS at 09:13

## 2024-02-28 RX ADMIN — SODIUM CHLORIDE, POTASSIUM CHLORIDE, SODIUM LACTATE AND CALCIUM CHLORIDE 250 ML: 600; 310; 30; 20 INJECTION, SOLUTION INTRAVENOUS at 20:55

## 2024-02-28 RX ADMIN — Medication 3 MG: at 20:28

## 2024-02-28 RX ADMIN — HYOSCYAMINE SULFATE 125 MCG: 0.12 TABLET ORAL; SUBLINGUAL at 20:27

## 2024-02-28 RX ADMIN — HEPARIN SODIUM 1900 UNITS: 1000 INJECTION, SOLUTION INTRAVENOUS; SUBCUTANEOUS at 09:38

## 2024-02-28 RX ADMIN — POLYETHYLENE GLYCOL 3350 17 G: 17 POWDER, FOR SOLUTION ORAL at 13:35

## 2024-02-28 RX ADMIN — DOCUSATE SODIUM 100 MG: 100 CAPSULE, LIQUID FILLED ORAL at 20:28

## 2024-02-28 RX ADMIN — FENTANYL CITRATE 25 MCG: 50 INJECTION, SOLUTION INTRAMUSCULAR; INTRAVENOUS at 09:24

## 2024-02-28 RX ADMIN — MIDAZOLAM HYDROCHLORIDE 0.5 MG: 1 INJECTION, SOLUTION INTRAMUSCULAR; INTRAVENOUS at 09:24

## 2024-02-28 RX ADMIN — SODIUM CHLORIDE, PRESERVATIVE FREE 5 ML: 5 INJECTION INTRAVENOUS at 13:45

## 2024-02-28 RX ADMIN — FENTANYL CITRATE 50 MCG: 50 INJECTION, SOLUTION INTRAMUSCULAR; INTRAVENOUS at 09:13

## 2024-02-28 RX ADMIN — SODIUM CHLORIDE, PRESERVATIVE FREE 10 ML: 5 INJECTION INTRAVENOUS at 20:27

## 2024-02-28 RX ADMIN — DOCUSATE SODIUM 100 MG: 100 CAPSULE, LIQUID FILLED ORAL at 13:35

## 2024-02-28 RX ADMIN — LIDOCAINE HYDROCHLORIDE 10 ML: 20 INJECTION, SOLUTION INFILTRATION; PERINEURAL at 09:26

## 2024-02-28 RX ADMIN — HEPARIN SODIUM 1900 UNITS: 1000 INJECTION, SOLUTION INTRAVENOUS; SUBCUTANEOUS at 09:39

## 2024-02-28 ASSESSMENT — PAIN SCALES - GENERAL
PAINLEVEL_OUTOF10: 4
PAINLEVEL_OUTOF10: 4

## 2024-02-28 ASSESSMENT — PAIN SCALES - WONG BAKER: WONGBAKER_NUMERICALRESPONSE: 4

## 2024-02-28 ASSESSMENT — PAIN DESCRIPTION - DESCRIPTORS
DESCRIPTORS: SPASM
DESCRIPTORS: SPASM

## 2024-02-28 ASSESSMENT — PAIN DESCRIPTION - LOCATION: LOCATION: ABDOMEN

## 2024-02-28 ASSESSMENT — PAIN DESCRIPTION - ORIENTATION
ORIENTATION: LOWER
ORIENTATION: LOWER

## 2024-02-28 ASSESSMENT — PAIN - FUNCTIONAL ASSESSMENT: PAIN_FUNCTIONAL_ASSESSMENT: ACTIVITIES ARE NOT PREVENTED

## 2024-02-28 NOTE — DIALYSIS
TRANSFER OUT- DIALYSIS    Hemodialysis treatment completed. PT ran 2.5 hours, without complications.    Patient alert and VS stable  BP (!) 96/59   Pulse 80   Temp 97.8 °F (36.6 °C)   Resp 16   Ht 1.753 m (5' 9.02\")   Wt 81.6 kg (180 lb)   SpO2 90%   BMI 26.57 kg/m²        0 Kg removed.      Flushed both ports with 10 mL of NS.  CVC dressing clean, dry, and intact, tego caps intact, curos caps placed.      Meds given: 0.    RBCs given during dialysis: 0    Patient to 810   after dialysis.

## 2024-02-28 NOTE — OR NURSING
TRANSFER - OUT REPORT:           Verbal report given to DAY Moody on Saulo Ya  being transferred to IR Recovery for routine post-op      Report consisted of patient’s Situation, Background, Assessment and Recommendations(SBAR).          Information from the following report(s) SBAR, Procedure Summary, and MAR was reviewed with the receiving nurse.       Opportunity for questions and clarification was provided.          Conscious Sedation:    75 Mcg of Fentanyl administered   1.5 Mg of Versed administered   0 Mg of Benadryl administered        Pt tolerated procedure well.     transparent film and Other: skin glue clean, dry, intact, and nontender    VITALS:  /63   Pulse 80   Temp 97.8 °F (36.6 °C) (Infrared)   Resp 16   Ht 1.753 m (5' 9.02\")   Wt 81.6 kg (180 lb)   SpO2 96%   BMI 26.57 kg/m²

## 2024-02-28 NOTE — OR NURSING
Recovery complete. Dressing remains clean and dry. Patient transported to dialysis via transport personnel at this time.

## 2024-02-28 NOTE — INTERVAL H&P NOTE
Update History & Physical    The patient's History and Physical of February 22, 2024 was reviewed with the patient and I examined the patient. Perm cath ordered. The surgical site was confirmed by the patient and me.     Plan: The risks, benefits, expected outcome, and alternative to the recommended procedure have been discussed with the patient. Patient understands and wants to proceed with the procedure.     Electronically signed by Jesús Perla MD on 2/28/2024 at 8:47 AM

## 2024-02-28 NOTE — OR NURSING
TRANSFER - OUT REPORT:           Verbal report given to DAY Kennedy on Saulo Ya  being transferred to 8th Floor for routine progression of patient care      Report consisted of patient’s Situation, Background, Assessment and Recommendations(SBAR).          Information from the following report(s) SBAR, Procedure Summary, and MAR was reviewed with the receiving nurse.       Opportunity for questions and clarification was provided.          Conscious Sedation:    75 Mcg of Fentanyl administered   1.5 Mg of Versed administered   0 Mg of Benadryl administered        Pt tolerated procedure well.     transparent film and Other: skin glue clean, dry, intact, and nontender    VITALS:  /63   Pulse 80   Temp 97.8 °F (36.6 °C) (Infrared)   Resp 16   Ht 1.753 m (5' 9.02\")   Wt 81.6 kg (180 lb)   SpO2 96%   BMI 26.57 kg/m²

## 2024-02-28 NOTE — DIALYSIS
TRANSFER IN - DIALYSIS    Received patient in dialysis unit  from IR (unit) for ordered procedure.    Consent verified for renal replacement therapy. Procedure explained to patient, opportunity for Q&A provided. Call light given.     Patient alert and vital signs stable. /65   Pulse 76   Temp 97.8 °F (36.6 °C) (Infrared)   Resp 16   Ht 1.753 m (5' 9.02\")   Wt 81.6 kg (180 lb)   SpO2 90%   BMI 26.57 kg/m²   Hemodialysis initiated using right cvc.  Aspirated and flushed both ports without difficulty. Dressing clean, dry and intact.  Machine settings per MD order.    Heparin 0 unit bolus and 0 units/hr.      Will monitor during treatment.

## 2024-02-28 NOTE — BRIEF OP NOTE
[facility]  Department of Interventional Radiology  [phone]        Interventional Radiology Brief Procedure Note    Patient: Saulo Ya MRN: [id number]  SSN: [id number]    YOB: 1970  Age: 53 y.o.  Sex: male      Date of Procedure: 2/28/2024    Pre-Procedure Diagnosis: ARF    Post-Procedure Diagnosis: SAME    Procedure(s): Tunneled Central Venous Catheter       Performed By: Jesús Perla MD     Assistants: None    Anesthesia:Moderate Sedation    Estimated Blood Loss: Less than 10ml    Specimens:  None    Implants:  Tunneled Hemodialysis Catheter         Complications: None    Recommendations: ready to use     Follow Up: with dialysis    Signed By: Jesús Perla MD     February 28, 2024      
trabeculated yuan tree shaped bladder concerning for chronic NAGEL with obstruction at bilateral Ureter orifices.  Bilateral hydro-ureteronephrosis with very tortuous bilateral ureters    Bilateral retrograde pyelogram interpretation:   Right Retrograde Pyelogram; Severe hydro-ureteronephrosis down to the level of bladder without filling defect and with very tortuous ureter     Left Retrograde Pyelogram; Severe hydro-ureteronephrosis down to the level of bladder without filling defect and with very tortuous ureter       Electronically signed by Sheldon Balderrama MD on 2/25/2024 at 2:08 PM

## 2024-02-28 NOTE — PRE SEDATION
Sedation Pre-Procedure Note    Patient Name: Saulo Ya   YOB: 1970  Room/Bed: 0[facility]  Medical Record Number: [id number]  Date: 2/28/2024   Time: 8:48 AM       Indication:  ARF    Consent: I have discussed with the patient and/or the patient representative the indication, alternatives, and the possible risks and/or complications of the planned procedure and the anesthesia methods. The patient and/or patient representative appear to understand and agree to proceed.    Vital Signs:   Vitals:    02/28/24 0836   BP: 117/70   Pulse: 86   Resp: 16   Temp: 97.8 °F (36.6 °C)   SpO2: 98%       Past Medical History:   has a past medical history of Esophageal stricture and GERD (gastroesophageal reflux disease).    Past Surgical History:   has a past surgical history that includes Esophagus dilation (11/2023) and Cystoscopy (Bilateral, 2/25/2024).    Medications:   Scheduled Meds:    polyethylene glycol  17 g Oral Daily    docusate sodium  100 mg Oral BID    sodium chloride flush  5-40 mL IntraVENous 2 times per day     Continuous Infusions:    sodium chloride      sodium chloride      sodium chloride       PRN Meds: sodium chloride, oxyCODONE, hydrALAZINE, hyoscyamine, sodium chloride, sodium chloride flush, sodium chloride, ondansetron **OR** ondansetron, polyethylene glycol, acetaminophen **OR** acetaminophen  Home Meds:   Prior to Admission medications    Not on File          Pre-Sedation Documentation and Exam:   I have personally completed a history, physical exam & review of systems for this patient (see notes).    Mallampati Airway Assessment:  Mallampati Class II - (soft palate, fauces & uvula are visible)    Prior History of Anesthesia Complications:   none    ASA Classification:  Class 3 - A patient with severe systemic disease that limits activity but is not incapacitating    Sedation/ Anesthesia Plan:   intravenous sedation    Medications Planned:   midazolam (Versed) intravenously and fentanyl

## 2024-02-28 NOTE — CARE COORDINATION
MELVIN CM:  patient to have HD PC placed today.  Patient has MRI of prostate pending.  Today patient's BUN is 162 Creatinine is 18.2 and Hgb is at 7.4.  patient being followed closely by urology and nephrology.  Case Management will continue to follow.

## 2024-02-29 LAB
ANION GAP SERPL CALC-SCNC: 11 MMOL/L (ref 2–11)
BASOPHILS # BLD: 0 K/UL (ref 0–0.2)
BASOPHILS NFR BLD: 1 % (ref 0–2)
BUN SERPL-MCNC: 135 MG/DL (ref 6–23)
CALCIUM SERPL-MCNC: 7.5 MG/DL (ref 8.3–10.4)
CHLORIDE SERPL-SCNC: 109 MMOL/L (ref 103–113)
CO2 SERPL-SCNC: 24 MMOL/L (ref 21–32)
CREAT SERPL-MCNC: 14 MG/DL (ref 0.8–1.5)
DIFFERENTIAL METHOD BLD: ABNORMAL
EOSINOPHIL # BLD: 0.3 K/UL (ref 0–0.8)
EOSINOPHIL NFR BLD: 6 % (ref 0.5–7.8)
ERYTHROCYTE [DISTWIDTH] IN BLOOD BY AUTOMATED COUNT: 12.2 % (ref 11.9–14.6)
GLUCOSE SERPL-MCNC: 121 MG/DL (ref 65–100)
HBV CORE AB SERPL QL IA: NEGATIVE
HCT VFR BLD AUTO: 21.7 % (ref 41.1–50.3)
HGB BLD-MCNC: 7.1 G/DL (ref 13.6–17.2)
IMM GRANULOCYTES # BLD AUTO: 0 K/UL (ref 0–0.5)
IMM GRANULOCYTES NFR BLD AUTO: 1 % (ref 0–5)
LYMPHOCYTES # BLD: 0.4 K/UL (ref 0.5–4.6)
LYMPHOCYTES NFR BLD: 8 % (ref 13–44)
MCH RBC QN AUTO: 30.9 PG (ref 26.1–32.9)
MCHC RBC AUTO-ENTMCNC: 32.7 G/DL (ref 31.4–35)
MCV RBC AUTO: 94.3 FL (ref 82–102)
MONOCYTES # BLD: 0.6 K/UL (ref 0.1–1.3)
MONOCYTES NFR BLD: 11 % (ref 4–12)
NEUTS SEG # BLD: 4.2 K/UL (ref 1.7–8.2)
NEUTS SEG NFR BLD: 73 % (ref 43–78)
NRBC # BLD: 0 K/UL (ref 0–0.2)
PLATELET # BLD AUTO: 158 K/UL (ref 150–450)
PMV BLD AUTO: 8.7 FL (ref 9.4–12.3)
POTASSIUM SERPL-SCNC: 4.1 MMOL/L (ref 3.5–5.1)
RBC # BLD AUTO: 2.3 M/UL (ref 4.23–5.6)
SODIUM SERPL-SCNC: 144 MMOL/L (ref 136–146)
WBC # BLD AUTO: 5.6 K/UL (ref 4.3–11.1)

## 2024-02-29 PROCEDURE — 2580000003 HC RX 258: Performed by: STUDENT IN AN ORGANIZED HEALTH CARE EDUCATION/TRAINING PROGRAM

## 2024-02-29 PROCEDURE — 80048 BASIC METABOLIC PNL TOTAL CA: CPT

## 2024-02-29 PROCEDURE — 36415 COLL VENOUS BLD VENIPUNCTURE: CPT

## 2024-02-29 PROCEDURE — 6370000000 HC RX 637 (ALT 250 FOR IP): Performed by: INTERNAL MEDICINE

## 2024-02-29 PROCEDURE — 1100000000 HC RM PRIVATE

## 2024-02-29 PROCEDURE — 85025 COMPLETE CBC W/AUTO DIFF WBC: CPT

## 2024-02-29 PROCEDURE — 6370000000 HC RX 637 (ALT 250 FOR IP): Performed by: NURSE PRACTITIONER

## 2024-02-29 PROCEDURE — 90935 HEMODIALYSIS ONE EVALUATION: CPT

## 2024-02-29 RX ORDER — HEPARIN SODIUM 1000 [USP'U]/ML
5000 INJECTION, SOLUTION INTRAVENOUS; SUBCUTANEOUS PRN
Status: DISCONTINUED | OUTPATIENT
Start: 2024-02-29 | End: 2024-03-05 | Stop reason: HOSPADM

## 2024-02-29 RX ADMIN — HYOSCYAMINE SULFATE 125 MCG: 0.12 TABLET ORAL; SUBLINGUAL at 19:50

## 2024-02-29 RX ADMIN — DOCUSATE SODIUM 100 MG: 100 CAPSULE, LIQUID FILLED ORAL at 19:50

## 2024-02-29 RX ADMIN — POLYETHYLENE GLYCOL 3350 17 G: 17 POWDER, FOR SOLUTION ORAL at 13:59

## 2024-02-29 RX ADMIN — SODIUM CHLORIDE, PRESERVATIVE FREE 10 ML: 5 INJECTION INTRAVENOUS at 19:52

## 2024-02-29 RX ADMIN — DOCUSATE SODIUM 100 MG: 100 CAPSULE, LIQUID FILLED ORAL at 13:59

## 2024-02-29 RX ADMIN — HYOSCYAMINE SULFATE 125 MCG: 0.12 TABLET ORAL; SUBLINGUAL at 14:04

## 2024-02-29 ASSESSMENT — PAIN SCALES - GENERAL
PAINLEVEL_OUTOF10: 0

## 2024-02-29 ASSESSMENT — PAIN DESCRIPTION - DESCRIPTORS: DESCRIPTORS: CRAMPING

## 2024-02-29 NOTE — DIALYSIS
TRANSFER OUT- DIALYSIS    Hemodialysis treatment completed. PT ran 3 hours, without complications.    Patient alert   /63  P 79       0 Kg removed.      Flushed both ports with 10 mL of NS.  CVC dressing clean, dry, and intact, tego caps intact, curos caps placed.      Meds given: 0.    RBCs given during dialysis: 0    Patient to 810 after dialysis.

## 2024-02-29 NOTE — CONSENT
Informed Consent for Blood Component Transfusion Note    I have discussed with the patient the rationale for blood component transfusion; its benefits in treating or preventing fatigue, organ damage, or death; and its risk which includes mild transfusion reactions, rare risk of blood borne infection, or more serious but rare reactions. I have discussed the alternatives to transfusion, including the risk and consequences of not receiving transfusion. The patient had an opportunity to ask questions and had agreed to proceed with transfusion of blood components.    Electronically signed by Perez Martinez DO on 2/29/24 at 1:52 PM EST

## 2024-02-29 NOTE — DIALYSIS
TRANSFER IN - DIALYSIS    Received patient in dialysis unit  from [facility] (unit) for ordered procedure.    Consent verified for renal replacement therapy. Procedure explained to patient, opportunity for Q&A provided. Call light given.     Patient alert and vital signs stable.  /59,  P81  , room air.    Hemodialysis initiated using right chest catheter.  Aspirated and flushed both ports without difficulty. Dressing clean, dry and intact.  Machine settings per MD order.    Heparin 0 unit bolus and 0 units/hr.      Will monitor during treatment.

## 2024-02-29 NOTE — CARE COORDINATION
MSN, CM:  patient has been accepted to [facility] to start MWF 3/6.  Nephrology and MD notified.  Case Management will continue to follow.

## 2024-03-01 ENCOUNTER — APPOINTMENT (OUTPATIENT)
Dept: MRI IMAGING | Age: 54
DRG: 660 | End: 2024-03-01
Attending: STUDENT IN AN ORGANIZED HEALTH CARE EDUCATION/TRAINING PROGRAM
Payer: COMMERCIAL

## 2024-03-01 LAB
ANION GAP SERPL CALC-SCNC: 7 MMOL/L (ref 2–11)
BASOPHILS # BLD: 0 K/UL (ref 0–0.2)
BASOPHILS NFR BLD: 1 % (ref 0–2)
BUN SERPL-MCNC: 93 MG/DL (ref 6–23)
CALCIUM SERPL-MCNC: 7.7 MG/DL (ref 8.3–10.4)
CHLORIDE SERPL-SCNC: 104 MMOL/L (ref 103–113)
CO2 SERPL-SCNC: 29 MMOL/L (ref 21–32)
CREAT SERPL-MCNC: 10.3 MG/DL (ref 0.8–1.5)
DIFFERENTIAL METHOD BLD: ABNORMAL
EOSINOPHIL # BLD: 0.3 K/UL (ref 0–0.8)
EOSINOPHIL NFR BLD: 5 % (ref 0.5–7.8)
ERYTHROCYTE [DISTWIDTH] IN BLOOD BY AUTOMATED COUNT: 12.1 % (ref 11.9–14.6)
GLUCOSE SERPL-MCNC: 94 MG/DL (ref 65–100)
HCT VFR BLD AUTO: 20.9 % (ref 41.1–50.3)
HGB BLD-MCNC: 7 G/DL (ref 13.6–17.2)
IMM GRANULOCYTES # BLD AUTO: 0.1 K/UL (ref 0–0.5)
IMM GRANULOCYTES NFR BLD AUTO: 1 % (ref 0–5)
LYMPHOCYTES # BLD: 0.6 K/UL (ref 0.5–4.6)
LYMPHOCYTES NFR BLD: 10 % (ref 13–44)
MCH RBC QN AUTO: 31.5 PG (ref 26.1–32.9)
MCHC RBC AUTO-ENTMCNC: 33 G/DL (ref 31.4–35)
MCV RBC AUTO: 95.4 FL (ref 82–102)
MONOCYTES # BLD: 0.6 K/UL (ref 0.1–1.3)
MONOCYTES NFR BLD: 11 % (ref 4–12)
NEUTS SEG # BLD: 4 K/UL (ref 1.7–8.2)
NEUTS SEG NFR BLD: 72 % (ref 43–78)
NRBC # BLD: 0 K/UL (ref 0–0.2)
PLATELET # BLD AUTO: 188 K/UL (ref 150–450)
PMV BLD AUTO: 9.1 FL (ref 9.4–12.3)
POTASSIUM SERPL-SCNC: 4.1 MMOL/L (ref 3.5–5.1)
RBC # BLD AUTO: 2.19 M/UL (ref 4.23–5.6)
SODIUM SERPL-SCNC: 140 MMOL/L (ref 136–146)
WBC # BLD AUTO: 5.5 K/UL (ref 4.3–11.1)

## 2024-03-01 PROCEDURE — 2580000003 HC RX 258: Performed by: STUDENT IN AN ORGANIZED HEALTH CARE EDUCATION/TRAINING PROGRAM

## 2024-03-01 PROCEDURE — 6370000000 HC RX 637 (ALT 250 FOR IP): Performed by: NURSE PRACTITIONER

## 2024-03-01 PROCEDURE — 72197 MRI PELVIS W/O & W/DYE: CPT

## 2024-03-01 PROCEDURE — 6360000004 HC RX CONTRAST MEDICATION: Performed by: PHYSICIAN ASSISTANT

## 2024-03-01 PROCEDURE — 1100000000 HC RM PRIVATE

## 2024-03-01 PROCEDURE — 85025 COMPLETE CBC W/AUTO DIFF WBC: CPT

## 2024-03-01 PROCEDURE — 80048 BASIC METABOLIC PNL TOTAL CA: CPT

## 2024-03-01 PROCEDURE — 99231 SBSQ HOSP IP/OBS SF/LOW 25: CPT | Performed by: PHYSICIAN ASSISTANT

## 2024-03-01 PROCEDURE — 90935 HEMODIALYSIS ONE EVALUATION: CPT

## 2024-03-01 PROCEDURE — A9579 GAD-BASE MR CONTRAST NOS,1ML: HCPCS | Performed by: PHYSICIAN ASSISTANT

## 2024-03-01 PROCEDURE — 6370000000 HC RX 637 (ALT 250 FOR IP): Performed by: INTERNAL MEDICINE

## 2024-03-01 RX ADMIN — DOCUSATE SODIUM 100 MG: 100 CAPSULE, LIQUID FILLED ORAL at 21:00

## 2024-03-01 RX ADMIN — HYOSCYAMINE SULFATE 125 MCG: 0.12 TABLET ORAL; SUBLINGUAL at 16:30

## 2024-03-01 RX ADMIN — SODIUM CHLORIDE, PRESERVATIVE FREE 10 ML: 5 INJECTION INTRAVENOUS at 20:59

## 2024-03-01 RX ADMIN — POLYETHYLENE GLYCOL 3350 17 G: 17 POWDER, FOR SOLUTION ORAL at 16:30

## 2024-03-01 RX ADMIN — GADOTERIDOL 17 ML: 279.3 INJECTION, SOLUTION INTRAVENOUS at 13:13

## 2024-03-01 RX ADMIN — DOCUSATE SODIUM 100 MG: 100 CAPSULE, LIQUID FILLED ORAL at 16:30

## 2024-03-01 RX ADMIN — SODIUM CHLORIDE, PRESERVATIVE FREE 10 ML: 5 INJECTION INTRAVENOUS at 09:00

## 2024-03-01 ASSESSMENT — PAIN SCALES - GENERAL: PAINLEVEL_OUTOF10: 0

## 2024-03-01 NOTE — PLAN OF CARE
Problem: Discharge Planning  Goal: Discharge to home or other facility with appropriate resources  Outcome: Progressing  Flowsheets (Taken 3/1/2024 7972)  Discharge to home or other facility with appropriate resources: Identify barriers to discharge with patient and caregiver     Problem: Safety - Adult  Goal: Free from fall injury  Outcome: Progressing     Problem: Pain  Goal: Verbalizes/displays adequate comfort level or baseline comfort level  Outcome: Progressing

## 2024-03-01 NOTE — DIALYSIS
TRANSFER IN - DIALYSIS    Received patient in dialysis unit  from [facility] (unit) for ordered procedure.    Consent verified for renal replacement therapy. Procedure explained to patient, opportunity for Q&A provided. Call light given.     Patient a/ox3 and vital signs stable.  /69,  P107  , 0L O2 via RA.    Hemodialysis initiated using R CVC.  Aspirated and flushed both ports without difficulty. Dressing clean, dry and intact.  Machine settings per MD order.    Heparin 1000 unit bolus and 500 units/hr.      Will monitor during treatment.

## 2024-03-01 NOTE — DIALYSIS
TRANSFER OUT- DIALYSIS    Hemodialysis treatment completed. PT ran 2 hours, without complications.    Patient a/ox3 and VS stable  /64  P 76      0.15 Kg retained    Flushed both ports with 10 mL of NS.  CVC dressing clean, dry, and intact, tego caps intact, curos caps placed.      Meds given: 0.    RBCs given during dialysis: 0    Patient to 810 after dialysis.

## 2024-03-01 NOTE — CARE COORDINATION
MSN, CM:  patient has been accepted to [facility] with a start date 3/6/2024.  Patient has no other discharge needs and can be discharged Monday after HD.  Case Management will continue to follow.

## 2024-03-02 LAB
ANION GAP SERPL CALC-SCNC: 7 MMOL/L (ref 2–11)
BASOPHILS # BLD: 0 K/UL (ref 0–0.2)
BASOPHILS NFR BLD: 1 % (ref 0–2)
BUN SERPL-MCNC: 70 MG/DL (ref 6–23)
CALCIUM SERPL-MCNC: 8 MG/DL (ref 8.3–10.4)
CHLORIDE SERPL-SCNC: 105 MMOL/L (ref 103–113)
CO2 SERPL-SCNC: 27 MMOL/L (ref 21–32)
CREAT SERPL-MCNC: 8.4 MG/DL (ref 0.8–1.5)
DIFFERENTIAL METHOD BLD: ABNORMAL
EOSINOPHIL # BLD: 0.3 K/UL (ref 0–0.8)
EOSINOPHIL NFR BLD: 6 % (ref 0.5–7.8)
ERYTHROCYTE [DISTWIDTH] IN BLOOD BY AUTOMATED COUNT: 12 % (ref 11.9–14.6)
GLUCOSE SERPL-MCNC: 103 MG/DL (ref 65–100)
HCT VFR BLD AUTO: 20.7 % (ref 41.1–50.3)
HGB BLD-MCNC: 6.9 G/DL (ref 13.6–17.2)
HGB BLD-MCNC: 7.7 G/DL (ref 13.6–17.2)
HISTORY CHECK: NORMAL
IMM GRANULOCYTES # BLD AUTO: 0 K/UL (ref 0–0.5)
IMM GRANULOCYTES NFR BLD AUTO: 1 % (ref 0–5)
LYMPHOCYTES # BLD: 0.8 K/UL (ref 0.5–4.6)
LYMPHOCYTES NFR BLD: 14 % (ref 13–44)
MCH RBC QN AUTO: 31.5 PG (ref 26.1–32.9)
MCHC RBC AUTO-ENTMCNC: 33.3 G/DL (ref 31.4–35)
MCV RBC AUTO: 94.5 FL (ref 82–102)
MONOCYTES # BLD: 0.7 K/UL (ref 0.1–1.3)
MONOCYTES NFR BLD: 12 % (ref 4–12)
NEUTS SEG # BLD: 3.8 K/UL (ref 1.7–8.2)
NEUTS SEG NFR BLD: 67 % (ref 43–78)
NRBC # BLD: 0 K/UL (ref 0–0.2)
PLATELET # BLD AUTO: 177 K/UL (ref 150–450)
PMV BLD AUTO: 9.1 FL (ref 9.4–12.3)
POTASSIUM SERPL-SCNC: 4.2 MMOL/L (ref 3.5–5.1)
RBC # BLD AUTO: 2.19 M/UL (ref 4.23–5.6)
SODIUM SERPL-SCNC: 139 MMOL/L (ref 136–146)
WBC # BLD AUTO: 5.7 K/UL (ref 4.3–11.1)

## 2024-03-02 PROCEDURE — 86901 BLOOD TYPING SEROLOGIC RH(D): CPT

## 2024-03-02 PROCEDURE — 90935 HEMODIALYSIS ONE EVALUATION: CPT

## 2024-03-02 PROCEDURE — 86850 RBC ANTIBODY SCREEN: CPT

## 2024-03-02 PROCEDURE — 80048 BASIC METABOLIC PNL TOTAL CA: CPT

## 2024-03-02 PROCEDURE — 6370000000 HC RX 637 (ALT 250 FOR IP): Performed by: INTERNAL MEDICINE

## 2024-03-02 PROCEDURE — 85025 COMPLETE CBC W/AUTO DIFF WBC: CPT

## 2024-03-02 PROCEDURE — 86923 COMPATIBILITY TEST ELECTRIC: CPT

## 2024-03-02 PROCEDURE — 6360000002 HC RX W HCPCS: Performed by: INTERNAL MEDICINE

## 2024-03-02 PROCEDURE — P9016 RBC LEUKOCYTES REDUCED: HCPCS

## 2024-03-02 PROCEDURE — 1100000000 HC RM PRIVATE

## 2024-03-02 PROCEDURE — 86900 BLOOD TYPING SEROLOGIC ABO: CPT

## 2024-03-02 PROCEDURE — 99232 SBSQ HOSP IP/OBS MODERATE 35: CPT | Performed by: PHYSICIAN ASSISTANT

## 2024-03-02 PROCEDURE — 2580000003 HC RX 258: Performed by: STUDENT IN AN ORGANIZED HEALTH CARE EDUCATION/TRAINING PROGRAM

## 2024-03-02 PROCEDURE — 6370000000 HC RX 637 (ALT 250 FOR IP): Performed by: NURSE PRACTITIONER

## 2024-03-02 PROCEDURE — 85014 HEMATOCRIT: CPT

## 2024-03-02 PROCEDURE — 36430 TRANSFUSION BLD/BLD COMPNT: CPT

## 2024-03-02 PROCEDURE — 85018 HEMOGLOBIN: CPT

## 2024-03-02 PROCEDURE — 36415 COLL VENOUS BLD VENIPUNCTURE: CPT

## 2024-03-02 PROCEDURE — 6370000000 HC RX 637 (ALT 250 FOR IP): Performed by: PHYSICIAN ASSISTANT

## 2024-03-02 RX ORDER — SODIUM CHLORIDE 9 MG/ML
INJECTION, SOLUTION INTRAVENOUS PRN
Status: DISCONTINUED | OUTPATIENT
Start: 2024-03-02 | End: 2024-03-05 | Stop reason: HOSPADM

## 2024-03-02 RX ORDER — TAMSULOSIN HYDROCHLORIDE 0.4 MG/1
0.4 CAPSULE ORAL DAILY
Status: DISCONTINUED | OUTPATIENT
Start: 2024-03-02 | End: 2024-03-05 | Stop reason: HOSPADM

## 2024-03-02 RX ORDER — BISACODYL 10 MG
10 SUPPOSITORY, RECTAL RECTAL DAILY PRN
Status: DISCONTINUED | OUTPATIENT
Start: 2024-03-02 | End: 2024-03-05 | Stop reason: HOSPADM

## 2024-03-02 RX ADMIN — DOCUSATE SODIUM 100 MG: 100 CAPSULE, LIQUID FILLED ORAL at 12:34

## 2024-03-02 RX ADMIN — DOCUSATE SODIUM 100 MG: 100 CAPSULE, LIQUID FILLED ORAL at 20:30

## 2024-03-02 RX ADMIN — POLYETHYLENE GLYCOL 3350 17 G: 17 POWDER, FOR SOLUTION ORAL at 12:34

## 2024-03-02 RX ADMIN — HEPARIN SODIUM 5000 UNITS: 1000 INJECTION INTRAVENOUS; SUBCUTANEOUS at 08:36

## 2024-03-02 RX ADMIN — SODIUM CHLORIDE, PRESERVATIVE FREE 10 ML: 5 INJECTION INTRAVENOUS at 20:30

## 2024-03-02 RX ADMIN — SODIUM CHLORIDE, PRESERVATIVE FREE 10 ML: 5 INJECTION INTRAVENOUS at 12:36

## 2024-03-02 RX ADMIN — TAMSULOSIN HYDROCHLORIDE 0.4 MG: 0.4 CAPSULE ORAL at 15:36

## 2024-03-02 RX ADMIN — HYOSCYAMINE SULFATE 125 MCG: 0.12 TABLET ORAL; SUBLINGUAL at 12:34

## 2024-03-02 RX ADMIN — BISACODYL 10 MG: 10 SUPPOSITORY RECTAL at 18:25

## 2024-03-02 NOTE — DIALYSIS
TRANSFER IN - DIALYSIS    Received patient in dialysis unit  from [facility] (unit) for ordered procedure.    Consent verified for renal replacement therapy. Procedure explained to patient, opportunity for Q&A provided. Call light given.     Patient a/ox3 and vital signs stable.  /57,  P78  , 0L O2 via RA.    Hemodialysis initiated using R CVC.  Aspirated and flushed both ports without difficulty. Dressing clean, dry and intact.  Machine settings per MD order.    Heparin 1000 unit bolus and 500 units/hr.      Will monitor during treatment.

## 2024-03-02 NOTE — DIALYSIS
TRANSFER OUT- DIALYSIS    Hemodialysis treatment completed. PT ran 3 hours, without complications.    Patient alert and VS stable  /62  P 85       0 Kg removed.      Flushed both ports with 10 mL of NS.  CVC dressing clean, dry, and intact, tego caps intact, curos caps placed.      Meds given: 0.    RBCs given during dialysis: 0    Patient to 810 after dialysis.     Type and screen taken and sent to lab.

## 2024-03-03 LAB
ABO + RH BLD: NORMAL
BASOPHILS # BLD: 0.1 K/UL (ref 0–0.2)
BASOPHILS NFR BLD: 1 % (ref 0–2)
BLD PROD TYP BPU: NORMAL
BLOOD BANK DISPENSE STATUS: NORMAL
BLOOD GROUP ANTIBODIES SERPL: NORMAL
BPU ID: NORMAL
BUN SERPL-MCNC: 45 MG/DL (ref 6–23)
CALCIUM SERPL-MCNC: 8 MG/DL (ref 8.3–10.4)
CHLORIDE SERPL-SCNC: 107 MMOL/L (ref 103–113)
CO2 SERPL-SCNC: 27 MMOL/L (ref 21–32)
CREAT SERPL-MCNC: 6.5 MG/DL (ref 0.8–1.5)
CROSSMATCH RESULT: NORMAL
EOSINOPHIL # BLD: 0.3 K/UL (ref 0–0.8)
EOSINOPHIL NFR BLD: 6 % (ref 0.5–7.8)
ERYTHROCYTE [DISTWIDTH] IN BLOOD BY AUTOMATED COUNT: 12 % (ref 11.9–14.6)
GLUCOSE SERPL-MCNC: 102 MG/DL (ref 65–100)
HCT VFR BLD AUTO: 23.1 % (ref 41.1–50.3)
IMM GRANULOCYTES # BLD AUTO: 0.1 K/UL (ref 0–0.5)
IMM GRANULOCYTES NFR BLD AUTO: 1 % (ref 0–5)
LYMPHOCYTES # BLD: 0.8 K/UL (ref 0.5–4.6)
MCH RBC QN AUTO: 31.7 PG (ref 26.1–32.9)
MCHC RBC AUTO-ENTMCNC: 33.8 G/DL (ref 31.4–35)
MCV RBC AUTO: 93.9 FL (ref 82–102)
MONOCYTES # BLD: 0.7 K/UL (ref 0.1–1.3)
MONOCYTES NFR BLD: 13 % (ref 4–12)
NEUTS SEG # BLD: 3.5 K/UL (ref 1.7–8.2)
NRBC # BLD: 0 K/UL (ref 0–0.2)
PLATELET # BLD AUTO: 169 K/UL (ref 150–450)
PMV BLD AUTO: 9 FL (ref 9.4–12.3)
RBC # BLD AUTO: 2.46 M/UL (ref 4.23–5.6)
SODIUM SERPL-SCNC: 142 MMOL/L (ref 136–146)
SPECIMEN EXP DATE BLD: NORMAL
UNIT DIVISION: 0
WBC # BLD AUTO: 5.4 K/UL (ref 4.3–11.1)

## 2024-03-03 PROCEDURE — 6370000000 HC RX 637 (ALT 250 FOR IP): Performed by: PHYSICIAN ASSISTANT

## 2024-03-03 PROCEDURE — 2580000003 HC RX 258: Performed by: STUDENT IN AN ORGANIZED HEALTH CARE EDUCATION/TRAINING PROGRAM

## 2024-03-03 PROCEDURE — 6370000000 HC RX 637 (ALT 250 FOR IP): Performed by: INTERNAL MEDICINE

## 2024-03-03 PROCEDURE — 6360000002 HC RX W HCPCS: Performed by: PHYSICIAN ASSISTANT

## 2024-03-03 PROCEDURE — 80048 BASIC METABOLIC PNL TOTAL CA: CPT

## 2024-03-03 PROCEDURE — 6370000000 HC RX 637 (ALT 250 FOR IP): Performed by: NURSE PRACTITIONER

## 2024-03-03 PROCEDURE — 1100000000 HC RM PRIVATE

## 2024-03-03 PROCEDURE — A4216 STERILE WATER/SALINE, 10 ML: HCPCS | Performed by: PHYSICIAN ASSISTANT

## 2024-03-03 PROCEDURE — 51702 INSERT TEMP BLADDER CATH: CPT | Performed by: PHYSICIAN ASSISTANT

## 2024-03-03 PROCEDURE — 36415 COLL VENOUS BLD VENIPUNCTURE: CPT

## 2024-03-03 PROCEDURE — 2580000003 HC RX 258: Performed by: PHYSICIAN ASSISTANT

## 2024-03-03 PROCEDURE — 99232 SBSQ HOSP IP/OBS MODERATE 35: CPT | Performed by: PHYSICIAN ASSISTANT

## 2024-03-03 PROCEDURE — 6370000000 HC RX 637 (ALT 250 FOR IP): Performed by: FAMILY MEDICINE

## 2024-03-03 PROCEDURE — 85025 COMPLETE CBC W/AUTO DIFF WBC: CPT

## 2024-03-03 RX ADMIN — SODIUM CHLORIDE, PRESERVATIVE FREE 10 ML: 5 INJECTION INTRAVENOUS at 09:55

## 2024-03-03 RX ADMIN — SODIUM CHLORIDE 1 MG: 9 INJECTION INTRAMUSCULAR; INTRAVENOUS; SUBCUTANEOUS at 16:59

## 2024-03-03 RX ADMIN — DOCUSATE SODIUM 100 MG: 100 CAPSULE, LIQUID FILLED ORAL at 09:30

## 2024-03-03 RX ADMIN — SODIUM CHLORIDE, PRESERVATIVE FREE 10 ML: 5 INJECTION INTRAVENOUS at 20:53

## 2024-03-03 RX ADMIN — TAMSULOSIN HYDROCHLORIDE 0.4 MG: 0.4 CAPSULE ORAL at 09:30

## 2024-03-03 RX ADMIN — Medication 3 MG: at 20:53

## 2024-03-03 RX ADMIN — DOCUSATE SODIUM 100 MG: 100 CAPSULE, LIQUID FILLED ORAL at 20:53

## 2024-03-03 RX ADMIN — HYOSCYAMINE SULFATE 125 MCG: 0.12 TABLET ORAL; SUBLINGUAL at 20:56

## 2024-03-03 ASSESSMENT — PAIN SCALES - GENERAL
PAINLEVEL_OUTOF10: 5
PAINLEVEL_OUTOF10: 0

## 2024-03-03 ASSESSMENT — PAIN DESCRIPTION - ORIENTATION: ORIENTATION: LEFT;LOWER;RIGHT

## 2024-03-03 ASSESSMENT — PAIN DESCRIPTION - LOCATION: LOCATION: ABDOMEN

## 2024-03-03 ASSESSMENT — PAIN - FUNCTIONAL ASSESSMENT: PAIN_FUNCTIONAL_ASSESSMENT: ACTIVITIES ARE NOT PREVENTED

## 2024-03-03 ASSESSMENT — PAIN SCALES - WONG BAKER
WONGBAKER_NUMERICALRESPONSE: 0

## 2024-03-03 ASSESSMENT — PAIN DESCRIPTION - DESCRIPTORS: DESCRIPTORS: SPASM

## 2024-03-04 LAB
ANION GAP SERPL CALC-SCNC: 10 MMOL/L (ref 2–11)
BASOPHILS # BLD: 0 K/UL (ref 0–0.2)
BASOPHILS NFR BLD: 1 % (ref 0–2)
BUN SERPL-MCNC: 58 MG/DL (ref 6–23)
CALCIUM SERPL-MCNC: 7.6 MG/DL (ref 8.3–10.4)
CHLORIDE SERPL-SCNC: 100 MMOL/L (ref 103–113)
CO2 SERPL-SCNC: 24 MMOL/L (ref 21–32)
CREAT SERPL-MCNC: 7.7 MG/DL (ref 0.8–1.5)
DIFFERENTIAL METHOD BLD: ABNORMAL
EOSINOPHIL # BLD: 0.4 K/UL (ref 0–0.8)
EOSINOPHIL NFR BLD: 6 % (ref 0.5–7.8)
ERYTHROCYTE [DISTWIDTH] IN BLOOD BY AUTOMATED COUNT: 12 % (ref 11.9–14.6)
GLUCOSE SERPL-MCNC: 99 MG/DL (ref 65–100)
HCT VFR BLD AUTO: 21.6 % (ref 41.1–50.3)
HGB BLD-MCNC: 7.5 G/DL (ref 13.6–17.2)
IMM GRANULOCYTES # BLD AUTO: 0.1 K/UL (ref 0–0.5)
IMM GRANULOCYTES NFR BLD AUTO: 1 % (ref 0–5)
LYMPHOCYTES # BLD: 0.7 K/UL (ref 0.5–4.6)
LYMPHOCYTES NFR BLD: 11 % (ref 13–44)
MCH RBC QN AUTO: 32.1 PG (ref 26.1–32.9)
MCHC RBC AUTO-ENTMCNC: 34.7 G/DL (ref 31.4–35)
MONOCYTES # BLD: 0.7 K/UL (ref 0.1–1.3)
MONOCYTES NFR BLD: 11 % (ref 4–12)
NEUTS SEG # BLD: 4.2 K/UL (ref 1.7–8.2)
NEUTS SEG NFR BLD: 70 % (ref 43–78)
NRBC # BLD: 0 K/UL (ref 0–0.2)
PLATELET # BLD AUTO: 183 K/UL (ref 150–450)
PMV BLD AUTO: 9.1 FL (ref 9.4–12.3)
POTASSIUM SERPL-SCNC: 4 MMOL/L (ref 3.5–5.1)
RBC # BLD AUTO: 2.34 M/UL (ref 4.23–5.6)
SODIUM SERPL-SCNC: 134 MMOL/L (ref 136–146)
WBC # BLD AUTO: 6 K/UL (ref 4.3–11.1)

## 2024-03-04 PROCEDURE — 1100000000 HC RM PRIVATE

## 2024-03-04 PROCEDURE — 80048 BASIC METABOLIC PNL TOTAL CA: CPT

## 2024-03-04 PROCEDURE — 6370000000 HC RX 637 (ALT 250 FOR IP): Performed by: FAMILY MEDICINE

## 2024-03-04 PROCEDURE — 6370000000 HC RX 637 (ALT 250 FOR IP): Performed by: NURSE PRACTITIONER

## 2024-03-04 PROCEDURE — 6370000000 HC RX 637 (ALT 250 FOR IP): Performed by: PHYSICIAN ASSISTANT

## 2024-03-04 PROCEDURE — 2580000003 HC RX 258: Performed by: STUDENT IN AN ORGANIZED HEALTH CARE EDUCATION/TRAINING PROGRAM

## 2024-03-04 PROCEDURE — 36415 COLL VENOUS BLD VENIPUNCTURE: CPT

## 2024-03-04 PROCEDURE — 85025 COMPLETE CBC W/AUTO DIFF WBC: CPT

## 2024-03-04 PROCEDURE — 6370000000 HC RX 637 (ALT 250 FOR IP): Performed by: INTERNAL MEDICINE

## 2024-03-04 RX ADMIN — SODIUM CHLORIDE, PRESERVATIVE FREE 10 ML: 5 INJECTION INTRAVENOUS at 09:08

## 2024-03-04 RX ADMIN — HYOSCYAMINE SULFATE 125 MCG: 0.12 TABLET ORAL; SUBLINGUAL at 09:05

## 2024-03-04 RX ADMIN — SODIUM CHLORIDE, PRESERVATIVE FREE 10 ML: 5 INJECTION INTRAVENOUS at 19:56

## 2024-03-04 RX ADMIN — DOCUSATE SODIUM 100 MG: 100 CAPSULE, LIQUID FILLED ORAL at 09:05

## 2024-03-04 RX ADMIN — DOCUSATE SODIUM 100 MG: 100 CAPSULE, LIQUID FILLED ORAL at 19:55

## 2024-03-04 RX ADMIN — TAMSULOSIN HYDROCHLORIDE 0.4 MG: 0.4 CAPSULE ORAL at 09:05

## 2024-03-04 RX ADMIN — HYOSCYAMINE SULFATE 125 MCG: 0.12 TABLET ORAL; SUBLINGUAL at 19:55

## 2024-03-04 RX ADMIN — Medication 3 MG: at 19:55

## 2024-03-04 RX ADMIN — HYOSCYAMINE SULFATE 125 MCG: 0.12 TABLET ORAL; SUBLINGUAL at 00:47

## 2024-03-04 ASSESSMENT — PAIN DESCRIPTION - DESCRIPTORS
DESCRIPTORS: SPASM
DESCRIPTORS: SPASM

## 2024-03-04 ASSESSMENT — PAIN DESCRIPTION - LOCATION
LOCATION: ABDOMEN
LOCATION: ABDOMEN

## 2024-03-04 ASSESSMENT — PAIN SCALES - GENERAL
PAINLEVEL_OUTOF10: 5
PAINLEVEL_OUTOF10: 0
PAINLEVEL_OUTOF10: 0
PAINLEVEL_OUTOF10: 5

## 2024-03-04 ASSESSMENT — PAIN - FUNCTIONAL ASSESSMENT
PAIN_FUNCTIONAL_ASSESSMENT: ACTIVITIES ARE NOT PREVENTED
PAIN_FUNCTIONAL_ASSESSMENT: ACTIVITIES ARE NOT PREVENTED

## 2024-03-04 ASSESSMENT — PAIN SCALES - WONG BAKER
WONGBAKER_NUMERICALRESPONSE: 0
WONGBAKER_NUMERICALRESPONSE: 0

## 2024-03-04 ASSESSMENT — PAIN DESCRIPTION - ORIENTATION
ORIENTATION: LOWER;LEFT;RIGHT
ORIENTATION: LEFT;RIGHT;LOWER

## 2024-03-04 NOTE — CARE COORDINATION
DARRYL CHARLES:  patient with no HD today for possible recovery.  Patient being followed closely by nephrology.  Patient has outpatient HD slot starting Wednesday.  Case Management will continue to follow.

## 2024-03-05 VITALS
WEIGHT: 180 LBS | BODY MASS INDEX: 26.66 KG/M2 | RESPIRATION RATE: 16 BRPM | OXYGEN SATURATION: 97 % | SYSTOLIC BLOOD PRESSURE: 110 MMHG | HEART RATE: 84 BPM | HEIGHT: 69 IN | TEMPERATURE: 97.7 F | DIASTOLIC BLOOD PRESSURE: 64 MMHG

## 2024-03-05 LAB
ANION GAP SERPL CALC-SCNC: 11 MMOL/L (ref 2–11)
BASOPHILS # BLD: 0 K/UL (ref 0–0.2)
BASOPHILS NFR BLD: 1 % (ref 0–2)
BUN SERPL-MCNC: 82 MG/DL (ref 6–23)
CALCIUM SERPL-MCNC: 7.6 MG/DL (ref 8.3–10.4)
CHLORIDE SERPL-SCNC: 101 MMOL/L (ref 103–113)
CO2 SERPL-SCNC: 25 MMOL/L (ref 21–32)
CREAT SERPL-MCNC: 9.6 MG/DL (ref 0.8–1.5)
DIFFERENTIAL METHOD BLD: ABNORMAL
EOSINOPHIL # BLD: 0.3 K/UL (ref 0–0.8)
GLUCOSE SERPL-MCNC: 116 MG/DL (ref 65–100)
HCT VFR BLD AUTO: 21.9 % (ref 41.1–50.3)
HGB BLD-MCNC: 7.5 G/DL (ref 13.6–17.2)
IMM GRANULOCYTES # BLD AUTO: 0.1 K/UL (ref 0–0.5)
IMM GRANULOCYTES NFR BLD AUTO: 1 % (ref 0–5)
LYMPHOCYTES # BLD: 0.4 K/UL (ref 0.5–4.6)
LYMPHOCYTES NFR BLD: 7 % (ref 13–44)
MCH RBC QN AUTO: 31.8 PG (ref 26.1–32.9)
MCHC RBC AUTO-ENTMCNC: 34.2 G/DL (ref 31.4–35)
MCV RBC AUTO: 92.8 FL (ref 82–102)
MONOCYTES # BLD: 0.6 K/UL (ref 0.1–1.3)
MONOCYTES NFR BLD: 9 % (ref 4–12)
NEUTS SEG # BLD: 5.1 K/UL (ref 1.7–8.2)
NRBC # BLD: 0 K/UL (ref 0–0.2)
PLATELET # BLD AUTO: 194 K/UL (ref 150–450)
PMV BLD AUTO: 9.2 FL (ref 9.4–12.3)
POTASSIUM SERPL-SCNC: 4.1 MMOL/L (ref 3.5–5.1)
RBC # BLD AUTO: 2.36 M/UL (ref 4.23–5.6)
SODIUM SERPL-SCNC: 137 MMOL/L (ref 136–146)
WBC # BLD AUTO: 6.5 K/UL (ref 4.3–11.1)

## 2024-03-05 PROCEDURE — 6360000002 HC RX W HCPCS: Performed by: INTERNAL MEDICINE

## 2024-03-05 PROCEDURE — 85025 COMPLETE CBC W/AUTO DIFF WBC: CPT

## 2024-03-05 PROCEDURE — 6370000000 HC RX 637 (ALT 250 FOR IP): Performed by: NURSE PRACTITIONER

## 2024-03-05 PROCEDURE — 36415 COLL VENOUS BLD VENIPUNCTURE: CPT

## 2024-03-05 PROCEDURE — 6370000000 HC RX 637 (ALT 250 FOR IP): Performed by: PHYSICIAN ASSISTANT

## 2024-03-05 PROCEDURE — 90935 HEMODIALYSIS ONE EVALUATION: CPT

## 2024-03-05 PROCEDURE — 80048 BASIC METABOLIC PNL TOTAL CA: CPT

## 2024-03-05 RX ORDER — TAMSULOSIN HYDROCHLORIDE 0.4 MG/1
0.4 CAPSULE ORAL DAILY
Qty: 30 CAPSULE | Refills: 1 | Status: SHIPPED | OUTPATIENT
Start: 2024-03-05

## 2024-03-05 RX ADMIN — HYOSCYAMINE SULFATE 125 MCG: 0.12 TABLET ORAL; SUBLINGUAL at 04:23

## 2024-03-05 RX ADMIN — HYOSCYAMINE SULFATE 125 MCG: 0.12 TABLET ORAL; SUBLINGUAL at 11:22

## 2024-03-05 RX ADMIN — HEPARIN SODIUM 5000 UNITS: 1000 INJECTION INTRAVENOUS; SUBCUTANEOUS at 07:16

## 2024-03-05 RX ADMIN — TAMSULOSIN HYDROCHLORIDE 0.4 MG: 0.4 CAPSULE ORAL at 11:18

## 2024-03-05 ASSESSMENT — PAIN DESCRIPTION - LOCATION: LOCATION: PELVIS

## 2024-03-05 ASSESSMENT — PAIN SCALES - GENERAL
PAINLEVEL_OUTOF10: 6
PAINLEVEL_OUTOF10: 0

## 2024-03-05 ASSESSMENT — PAIN - FUNCTIONAL ASSESSMENT: PAIN_FUNCTIONAL_ASSESSMENT: ACTIVITIES ARE NOT PREVENTED

## 2024-03-05 ASSESSMENT — PAIN DESCRIPTION - DESCRIPTORS: DESCRIPTORS: SPASM

## 2024-03-05 ASSESSMENT — PAIN SCALES - WONG BAKER: WONGBAKER_NUMERICALRESPONSE: 0

## 2024-03-05 ASSESSMENT — PAIN DESCRIPTION - ORIENTATION: ORIENTATION: RIGHT;LEFT;LOWER

## 2024-03-05 NOTE — DISCHARGE SUMMARY
Hospitalist Discharge Summary   Admit Date:  2024  7:21 PM   DC Note date: 3/5/2024  Name:  Saulo Ya   Age:  53 y.o.  Sex:  male  :  1970   MRN:  [id number]   Room:  [facility]  PCP:  No primary care provider on file.    Presenting Complaint: No chief complaint on file.     Initial Admission Diagnosis: CLAUDIA (acute kidney injury) ([facility]) [N17.9]     Problem List for this Hospitalization (present on admission):    Principal Problem:    CLAUDIA (acute kidney injury) ([facility])  Active Problems:    Acute blood loss anemia    Urine retention    Hematuria    Bilateral hydronephrosis    Elevated PSA    Bladder wall thickening    Acute renal failure on dialysis ([facility])  Resolved Problems:    * No resolved hospital problems. *      Hospital Course:    53-year-old male history of GERD admitted with acute kidney injury serum creatinine 22 acute blood loss anemia with hematuria hemoglobin 6.5.  He required total 2 unit packed red blood cell transfusion during hospital stay.  Originally admitted to [facility] nephrology and urology recommended transfer downto.  He did require bicarbonate drip underwent placement of dialysis catheter and hemodialysis.  Found to have significant obstructive disease with bladder outlet obstruction with significantly enlarged prostate.  Required bilateral ureteral stents and Payne catheter.  During the weekend of  attempted removal of Payne catheter but failed voiding trial.  MRI of prostate showed large masses on left and right lobe.  Confirmed with urology that follow-up is scheduled and biopsy will be planned for disposition about further intervention.  On  hemodialysis was held as his creatinine had improved but not near normalized and unfortunately failed continued improvement and underwent hemodialysis again today on .  He is scheduled for follow-up and hemodialysis tomorrow  and has an outpatient slot for  and Friday.  Will need nephrology and

## 2024-03-05 NOTE — PROGRESS NOTES
Saulo Ya  Admission Date: 2/22/2024         [facility] Progress Note: 3/3/2024    Follow-up for: CLAUDIA     The patient's chart is reviewed and the patient is discussed with the staff.    Subjective:   Pt seen and examined on dialysis    ROS:  Gen - no fever, no chills  CV - no chest pain  Lung - no shortness of breath, no cough  Abd - no tenderness, no nausea/vomiting, no diarrhea  Ext - no edema    Current Facility-Administered Medications   Medication Dose Route Frequency    0.9 % sodium chloride infusion   IntraVENous PRN    tamsulosin (FLOMAX) capsule 0.4 mg  0.4 mg Oral Daily    bisacodyl (DULCOLAX) suppository 10 mg  10 mg Rectal Daily PRN    heparin (porcine) injection 5,000 Units  5,000 Units IntraVENous PRN    polyethylene glycol (GLYCOLAX) packet 17 g  17 g Oral Daily    melatonin tablet 3 mg  3 mg Oral Nightly PRN    docusate sodium (COLACE) capsule 100 mg  100 mg Oral BID    0.9 % sodium chloride infusion   IntraVENous PRN    oxyCODONE (ROXICODONE) immediate release tablet 5 mg  5 mg Oral Q8H PRN    hydrALAZINE (APRESOLINE) injection 10 mg  10 mg IntraVENous Q6H PRN    hyoscyamine (LEVSIN/SL) sublingual tablet 125 mcg  125 mcg SubLINGual Q4H PRN    0.9 % sodium chloride infusion   IntraVENous PRN    sodium chloride flush 0.9 % injection 5-40 mL  5-40 mL IntraVENous 2 times per day    sodium chloride flush 0.9 % injection 5-40 mL  5-40 mL IntraVENous PRN    0.9 % sodium chloride infusion   IntraVENous PRN    ondansetron (ZOFRAN-ODT) disintegrating tablet 4 mg  4 mg Oral Q8H PRN    Or    ondansetron (ZOFRAN) injection 4 mg  4 mg IntraVENous Q6H PRN    polyethylene glycol (GLYCOLAX) packet 17 g  17 g Oral Daily PRN    acetaminophen (TYLENOL) tablet 650 mg  650 mg Oral Q6H PRN    Or    acetaminophen (TYLENOL) suppository 650 mg  650 mg Rectal Q6H PRN         Objective:     Vitals:    03/02/24 2249 03/03/24 0403 03/03/24 0715 03/03/24 1056   BP: 107/63 106/62 105/65 113/73   Pulse: 
                     Saulo Ya  Admission Date: 2/22/2024         [facility] Progress Note: 2/28/2024    Follow-up for: CLAUDIA     The patient's chart is reviewed and the patient is discussed with the staff.    Subjective:   Pt seen and examined on HD-#1 right TCC placed earlier today in IR, 250 Qb,  tolerating dialysis.   -noted good uop via interiano, hematuria   s/p bilateral ureteral stent.    ROS:  Gen - no fever, no chills  CV - no chest pain  Lung - no shortness of breath, no cough  Abd - no tenderness, no nausea/vomiting, no diarrhea  Ext - no edema    Current Facility-Administered Medications   Medication Dose Route Frequency    polyethylene glycol (GLYCOLAX) packet 17 g  17 g Oral Daily    docusate sodium (COLACE) capsule 100 mg  100 mg Oral BID    0.9 % sodium chloride infusion   IntraVENous PRN    oxyCODONE (ROXICODONE) immediate release tablet 5 mg  5 mg Oral Q8H PRN    hydrALAZINE (APRESOLINE) injection 10 mg  10 mg IntraVENous Q6H PRN    hyoscyamine (LEVSIN/SL) sublingual tablet 125 mcg  125 mcg SubLINGual Q4H PRN    0.9 % sodium chloride infusion   IntraVENous PRN    sodium chloride flush 0.9 % injection 5-40 mL  5-40 mL IntraVENous 2 times per day    sodium chloride flush 0.9 % injection 5-40 mL  5-40 mL IntraVENous PRN    0.9 % sodium chloride infusion   IntraVENous PRN    ondansetron (ZOFRAN-ODT) disintegrating tablet 4 mg  4 mg Oral Q8H PRN    Or    ondansetron (ZOFRAN) injection 4 mg  4 mg IntraVENous Q6H PRN    polyethylene glycol (GLYCOLAX) packet 17 g  17 g Oral Daily PRN    acetaminophen (TYLENOL) tablet 650 mg  650 mg Oral Q6H PRN    Or    acetaminophen (TYLENOL) suppository 650 mg  650 mg Rectal Q6H PRN         Objective:     Vitals:    02/28/24 0958 02/28/24 1008 02/28/24 1033 02/28/24 1100   BP: (!) 104/59 114/65 113/73 119/79   Pulse: 75 76 71 74   Resp: 16 16 16    Temp:   97.8 °F (36.6 °C)    TempSrc:       SpO2: 97% 90%     Weight:       Height:         Intake and Output: 
                     Saulo Ya  Admission Date: 2/22/2024         [facility] Progress Note: 2/26/2024    Follow-up for: CLAUDIA     The patient's chart is reviewed and the patient is discussed with the staff.    Subjective:   Pt seen and examined in room no overt uremic symptoms, noted hematuria via interiano s/p bilateral ureteral stent.    ROS:  Gen - no fever, no chills  CV - no chest pain  Lung - no shortness of breath, no cough  Abd - no tenderness, no nausea/vomiting, no diarrhea  Ext - no edema    Current Facility-Administered Medications   Medication Dose Route Frequency    0.9 % sodium chloride infusion   IntraVENous Continuous    0.9 % sodium chloride infusion   IntraVENous PRN    oxyCODONE (ROXICODONE) immediate release tablet 5 mg  5 mg Oral Q8H PRN    hydrALAZINE (APRESOLINE) injection 10 mg  10 mg IntraVENous Q6H PRN    hyoscyamine (LEVSIN/SL) sublingual tablet 125 mcg  125 mcg SubLINGual Q4H PRN    0.9 % sodium chloride infusion   IntraVENous PRN    sodium chloride flush 0.9 % injection 5-40 mL  5-40 mL IntraVENous 2 times per day    sodium chloride flush 0.9 % injection 5-40 mL  5-40 mL IntraVENous PRN    0.9 % sodium chloride infusion   IntraVENous PRN    ondansetron (ZOFRAN-ODT) disintegrating tablet 4 mg  4 mg Oral Q8H PRN    Or    ondansetron (ZOFRAN) injection 4 mg  4 mg IntraVENous Q6H PRN    polyethylene glycol (GLYCOLAX) packet 17 g  17 g Oral Daily PRN    acetaminophen (TYLENOL) tablet 650 mg  650 mg Oral Q6H PRN    Or    acetaminophen (TYLENOL) suppository 650 mg  650 mg Rectal Q6H PRN         Objective:     Vitals:    02/25/24 1613 02/25/24 2325 02/26/24 0423 02/26/24 0740   BP: (!) 141/84 123/83 119/74 121/72   Pulse: 84 89 79 84   Resp: 18 18 20 18   Temp:  97.5 °F (36.4 °C) 97.3 °F (36.3 °C) 97.7 °F (36.5 °C)   TempSrc: Oral Axillary  Oral   SpO2: 97% 100% 98% 99%   Weight:       Height:         Intake and Output:   02/24 1901 - 02/26 0700  In: 2911.3 [I.V.:2339.6]  Out: 5596 
                     Saulo Ya  Admission Date: 2/22/2024         [facility] Progress Note: 3/1/2024    Follow-up for: CLAUDIA     The patient's chart is reviewed and the patient is discussed with the staff.    Subjective:   Pt seen and examined on, denies complaints, waiting on MRI. He tolerated HD well yesterday    ROS:  Gen - no fever, no chills  CV - no chest pain  Lung - no shortness of breath, no cough  Abd - no tenderness, no nausea/vomiting, no diarrhea  Ext - no edema    Current Facility-Administered Medications   Medication Dose Route Frequency    heparin (porcine) injection 5,000 Units  5,000 Units IntraVENous PRN    polyethylene glycol (GLYCOLAX) packet 17 g  17 g Oral Daily    melatonin tablet 3 mg  3 mg Oral Nightly PRN    docusate sodium (COLACE) capsule 100 mg  100 mg Oral BID    0.9 % sodium chloride infusion   IntraVENous PRN    oxyCODONE (ROXICODONE) immediate release tablet 5 mg  5 mg Oral Q8H PRN    hydrALAZINE (APRESOLINE) injection 10 mg  10 mg IntraVENous Q6H PRN    hyoscyamine (LEVSIN/SL) sublingual tablet 125 mcg  125 mcg SubLINGual Q4H PRN    0.9 % sodium chloride infusion   IntraVENous PRN    sodium chloride flush 0.9 % injection 5-40 mL  5-40 mL IntraVENous 2 times per day    sodium chloride flush 0.9 % injection 5-40 mL  5-40 mL IntraVENous PRN    0.9 % sodium chloride infusion   IntraVENous PRN    ondansetron (ZOFRAN-ODT) disintegrating tablet 4 mg  4 mg Oral Q8H PRN    Or    ondansetron (ZOFRAN) injection 4 mg  4 mg IntraVENous Q6H PRN    polyethylene glycol (GLYCOLAX) packet 17 g  17 g Oral Daily PRN    acetaminophen (TYLENOL) tablet 650 mg  650 mg Oral Q6H PRN    Or    acetaminophen (TYLENOL) suppository 650 mg  650 mg Rectal Q6H PRN         Objective:     Vitals:    02/29/24 1947 02/29/24 2343 03/01/24 0323 03/01/24 0723   BP: (!) 94/58 117/60 (!) 104/56 111/61   Pulse: 88 92 88 81   Resp: 19 19 19 18   Temp: 97.9 °F (36.6 °C) 99.5 °F (37.5 °C) 99 °F (37.2 °C) 98.6 °F (37 °C) 
                     Saulo Ya  Admission Date: 2/22/2024         [facility] Progress Note: 3/4/2024    Follow-up for: CLAUDIA     The patient's chart is reviewed and the patient is discussed with the staff.    Subjective:   Pt seen and examined in room reports better uop with interiano replaced 3/4.     ROS:  Gen - no fever, no chills  CV - no chest pain  Lung - no shortness of breath, no cough  Abd - no tenderness, no nausea/vomiting, no diarrhea  Ext - no edema    Current Facility-Administered Medications   Medication Dose Route Frequency    0.9 % sodium chloride infusion   IntraVENous PRN    tamsulosin (FLOMAX) capsule 0.4 mg  0.4 mg Oral Daily    bisacodyl (DULCOLAX) suppository 10 mg  10 mg Rectal Daily PRN    heparin (porcine) injection 5,000 Units  5,000 Units IntraVENous PRN    polyethylene glycol (GLYCOLAX) packet 17 g  17 g Oral Daily    melatonin tablet 3 mg  3 mg Oral Nightly PRN    docusate sodium (COLACE) capsule 100 mg  100 mg Oral BID    0.9 % sodium chloride infusion   IntraVENous PRN    oxyCODONE (ROXICODONE) immediate release tablet 5 mg  5 mg Oral Q8H PRN    hydrALAZINE (APRESOLINE) injection 10 mg  10 mg IntraVENous Q6H PRN    hyoscyamine (LEVSIN/SL) sublingual tablet 125 mcg  125 mcg SubLINGual Q4H PRN    0.9 % sodium chloride infusion   IntraVENous PRN    sodium chloride flush 0.9 % injection 5-40 mL  5-40 mL IntraVENous 2 times per day    sodium chloride flush 0.9 % injection 5-40 mL  5-40 mL IntraVENous PRN    0.9 % sodium chloride infusion   IntraVENous PRN    ondansetron (ZOFRAN-ODT) disintegrating tablet 4 mg  4 mg Oral Q8H PRN    Or    ondansetron (ZOFRAN) injection 4 mg  4 mg IntraVENous Q6H PRN    polyethylene glycol (GLYCOLAX) packet 17 g  17 g Oral Daily PRN    acetaminophen (TYLENOL) tablet 650 mg  650 mg Oral Q6H PRN    Or    acetaminophen (TYLENOL) suppository 650 mg  650 mg Rectal Q6H PRN         Objective:     Vitals:    03/03/24 2000 03/03/24 2334 03/04/24 0402 03/04/24 0727 
                     Saulo Ya  Admission Date: 2/22/2024         [facility] Progress Note: 3/5/2024    Follow-up for: CLAUDIA     The patient's chart is reviewed and the patient is discussed with the staff.    Subjective:   Pt seen and examined on HD, right  Qb, , tolerating dialysis no UF.   interiano replaced 3/4.     ROS:  Gen - no fever, no chills  CV - no chest pain  Lung - no shortness of breath, no cough  Abd - no tenderness, no nausea/vomiting, no diarrhea  Ext - no edema    Current Facility-Administered Medications   Medication Dose Route Frequency    0.9 % sodium chloride infusion   IntraVENous PRN    tamsulosin (FLOMAX) capsule 0.4 mg  0.4 mg Oral Daily    bisacodyl (DULCOLAX) suppository 10 mg  10 mg Rectal Daily PRN    heparin (porcine) injection 5,000 Units  5,000 Units IntraVENous PRN    polyethylene glycol (GLYCOLAX) packet 17 g  17 g Oral Daily    melatonin tablet 3 mg  3 mg Oral Nightly PRN    docusate sodium (COLACE) capsule 100 mg  100 mg Oral BID    0.9 % sodium chloride infusion   IntraVENous PRN    oxyCODONE (ROXICODONE) immediate release tablet 5 mg  5 mg Oral Q8H PRN    hydrALAZINE (APRESOLINE) injection 10 mg  10 mg IntraVENous Q6H PRN    hyoscyamine (LEVSIN/SL) sublingual tablet 125 mcg  125 mcg SubLINGual Q4H PRN    0.9 % sodium chloride infusion   IntraVENous PRN    sodium chloride flush 0.9 % injection 5-40 mL  5-40 mL IntraVENous 2 times per day    sodium chloride flush 0.9 % injection 5-40 mL  5-40 mL IntraVENous PRN    0.9 % sodium chloride infusion   IntraVENous PRN    ondansetron (ZOFRAN-ODT) disintegrating tablet 4 mg  4 mg Oral Q8H PRN    Or    ondansetron (ZOFRAN) injection 4 mg  4 mg IntraVENous Q6H PRN    polyethylene glycol (GLYCOLAX) packet 17 g  17 g Oral Daily PRN    acetaminophen (TYLENOL) tablet 650 mg  650 mg Oral Q6H PRN    Or    acetaminophen (TYLENOL) suppository 650 mg  650 mg Rectal Q6H PRN         Objective:     Vitals:    03/05/24 0330 03/05/24 0712 
                     Saulo Ya  Admission Date: 2/22/2024         [facility] Progress Note: 2/29/2024    Follow-up for: CLAUDIA     The patient's chart is reviewed and the patient is discussed with the staff.    Subjective:   Pt seen and examined on HD-#1 right TCC   300 Qb,  tolerating dialysis.   -noted good uop via interiano, hematuria   s/p bilateral ureteral stent.    ROS:  Gen - no fever, no chills  CV - no chest pain  Lung - no shortness of breath, no cough  Abd - no tenderness, no nausea/vomiting, no diarrhea  Ext - no edema    Current Facility-Administered Medications   Medication Dose Route Frequency    polyethylene glycol (GLYCOLAX) packet 17 g  17 g Oral Daily    melatonin tablet 3 mg  3 mg Oral Nightly PRN    docusate sodium (COLACE) capsule 100 mg  100 mg Oral BID    0.9 % sodium chloride infusion   IntraVENous PRN    oxyCODONE (ROXICODONE) immediate release tablet 5 mg  5 mg Oral Q8H PRN    hydrALAZINE (APRESOLINE) injection 10 mg  10 mg IntraVENous Q6H PRN    hyoscyamine (LEVSIN/SL) sublingual tablet 125 mcg  125 mcg SubLINGual Q4H PRN    0.9 % sodium chloride infusion   IntraVENous PRN    sodium chloride flush 0.9 % injection 5-40 mL  5-40 mL IntraVENous 2 times per day    sodium chloride flush 0.9 % injection 5-40 mL  5-40 mL IntraVENous PRN    0.9 % sodium chloride infusion   IntraVENous PRN    ondansetron (ZOFRAN-ODT) disintegrating tablet 4 mg  4 mg Oral Q8H PRN    Or    ondansetron (ZOFRAN) injection 4 mg  4 mg IntraVENous Q6H PRN    polyethylene glycol (GLYCOLAX) packet 17 g  17 g Oral Daily PRN    acetaminophen (TYLENOL) tablet 650 mg  650 mg Oral Q6H PRN    Or    acetaminophen (TYLENOL) suppository 650 mg  650 mg Rectal Q6H PRN         Objective:     Vitals:    02/29/24 0855 02/29/24 0930 02/29/24 1000 02/29/24 1030   BP: (!) 102/59 97/60 (!) 95/59 (!) 89/55   Pulse: 81 79 80 76   Resp:       Temp:       TempSrc:       SpO2:       Weight:       Height:         Intake and Output: 
       Hospitalist Progress Note   Admit Date:  2024  7:21 PM   Name:  Saulo Ya   Age:  53 y.o.  Sex:  male  :  1970   MRN:  [id number]   Room:  810/    Presenting/Chief Complaint: No chief complaint on file.     Reason(s) for Admission: CLAUDIA (acute kidney injury) (HCC) [N17.9]     Hospital Course:       Saulo Ya is a 53 y.o. male with medical history of GERD admitted with CLAUDIA (creatinine 22) and Acute blood loss anemia (HGB 6.5).    Transferred to  from  for nephrology/urology    On IV bicarb drip, adjusted to NS    CTAP shows severe bilateral hydronephrosis and bladder wall thickening     Payne placed with hematuria    Urology consulted     MRI prostate with contrast ordered but held due to CLAUDIA     PSA elevated     S/p -  cystoscopy, ureteroscopy, retrograde pyelogram, bilateral stents      Transfused 3 unit blood   B12, folate low      VTACH  7 beat noted overnight   Denies chest pain or flutter   ECHO pending         Lives alone  Sister available  Works as          Subjective & 24hr Events:     Waiting   Eats   No BM  Passes gas        Assessment & Plan:     Principal Problem:    CLAUDIA (acute kidney injury) ([facility])  Plan:   24  Creatinine 22.0 --> 21.0--> 19.7 --> 20 --> 19 --> 18.6  Nephrology consulted , I spoke with Dr. Singleton who feels may need hemodialysis   Payne in place   Trend daily labs  IVF stopped  S/p -  bilateral ureteral stents to increase renal drainage          Severe bilateral hydronephrosis  Urine retention  Hematuria  24  Payne in place  Urology consulted ,  ureteral stents    followup MRI prostate when able    levsin for bladder spasms   as needed oral oxycodone for pain              Acute blood loss anemia:  24  Sp 1 unit blood - and 24  HGB 6.5 --> 7.4--> 7.3 --> 6.4 --> 8.1 --> 7.9 --> 7.5  Will need  B12, folate   Transfuse if HGB declines < 7.0 , recheck daily         Elevated blood pressure   24  Likely elevated 
       Hospitalist Progress Note   Admit Date:  2024  7:21 PM   Name:  Saulo Ya   Age:  53 y.o.  Sex:  male  :  1970   MRN:  [id number]   Room:  810/    Presenting/Chief Complaint: No chief complaint on file.     Reason(s) for Admission: CLAUDIA (acute kidney injury) ([facility]) [N17.9]     Hospital Course:       Saulo Ya is a 53 y.o. male with medical history of GERD admitted with CLAUDIA (creatinine 22) and Acute blood loss anemia (HGB 6.5).    Transferred to DT from  for nephrology/urology    On IV bicarb drip, adjusted to NS    CTAP shows severe bilateral hydronephrosis and bladder wall thickening     Payne placed with hematuria    Urology consulted     MRI prostate with contrast ordered but held due to CLAUDIA     PSA elevated     S/p -  cystoscopy, ureteroscopy, retrograde pyelogram, bilateral stents    Renal function failed to overall improve with discussions of possible hemodialysis      Renal US shows severe bilateral hydronephrosis       Transfused 3 unit blood   B12, folate low      VTACH  7 beat noted overnight   Denies chest pain or flutter   ECHO EF 55-60%, LV mild dilation, normal diastolic function        Lives alone  Sister available  Works as          Subjective & 24hr Events:     Some pain  Eats ok  No BM          Assessment & Plan:     Principal Problem:    CLAUDIA (acute kidney injury) ([facility])  Plan:   -  Creatinine 22.0 --> 21.0--> 19.7 --> 20 --> 19 --> 18.6 --> 18.2  S/p 2-  bilateral ureteral stents to increase renal drainage  Nephrology consulted, messaged nephrology, will keep NPO  IR consulted for tunneled central venous catheter for start of hemodialysis   Payne in place   Trend daily labs  IVF stopped            Severe bilateral hydronephrosis  Urine retention  Hematuria  -  Payne in place  Urology consulted ,  ureteral stents     MRI prostate pending, ok to proceed per nephrology   levsin for bladder spasms   as needed oral oxycodone for 
       Hospitalist Progress Note   Admit Date:  2024  7:21 PM   Name:  Saulo Ya   Age:  53 y.o.  Sex:  male  :  1970   MRN:  [id number]   Room:  810/01    Presenting/Chief Complaint: No chief complaint on file.     Reason(s) for Admission: CLAUDIA (acute kidney injury) (HCC) [N17.9]     Hospital Course:     Copied from prior provider HPI/summary:  Saulo Ya is a 53 y.o. male with medical history of GERD admitted with CLAUDIA (creatinine 22) and Acute blood loss anemia (HGB 6.5).     Transferred to  from  for nephrology/urology     On IV bicarb drip, adjusted to NS     CTAP shows severe bilateral hydronephrosis and bladder wall thickening      Payne placed with hematuria     Urology consulted      MRI prostate with contrast ordered but held due to CLAUDIA      PSA elevated      S/p 2-  cystoscopy, ureteroscopy, retrograde pyelogram, bilateral stents     Renal function failed to overall improve with discussions of possible hemodialysis        Renal US shows severe bilateral hydronephrosis         Transfused 3 unit blood   B12, folate low        VTACH  7 beat noted overnight   Denies chest pain or flutter   ECHO EF 55-60%, LV mild dilation, normal diastolic function           Lives alone  Sister available  Works as             Subjective & 24hr Events:     MRI of prostate revealed left and right lobe masses-per patient urology informed him biopsy will be planned.  Unclear of timing.  Patient expressed concern that it did not \"seem like anyone knew what was happening\".  Clarified with him plan of care-urology to plan biopsy and/or other intervention-at present time does not appear to be scheduled for inpatient and reason for continued hospitalization is need for continued hemodialysis.  He has outpatient dialysis slot arranged for Wednesday next week.  Would need discharge with home health Monday.  I cannot clarify for him timing of next intervention with urology and encouraged him to discuss 
       Hospitalist Progress Note   Admit Date:  2024  7:21 PM   Name:  Saulo Ya   Age:  53 y.o.  Sex:  male  :  1970   MRN:  [id number]   Room:  810/    Presenting/Chief Complaint: No chief complaint on file.     Reason(s) for Admission: CLAUDIA (acute kidney injury) (formerly [facility]) [N17.9]     Hospital Course:       Saulo Ya is a 53 y.o. male with medical history of GERD admitted with CLAUDIA (creatinine 22) and Acute blood loss anemia (HGB 6.5).    Transferred to DT from  for nephrology/urology    On IV bicarb drip    CTAP shows severe bilateral hydronephrosis and bladder wall thickening     Payne placed with hematuria    Urology consulted       Transfused 1 unit blood       Lives alone  Sister coming to visit  Works as          Subjective & 24hr Events:       Payne in place with hematuria and clots per RN  Has bladder spasms  Eating some  Had been making less urine and was retaining  Told has kidney issues as baby but unknown details  No GI bleeding        Assessment & Plan:     Principal Problem:    CLAUDIA (acute kidney injury) (formerly [facility])  Plan:   2-24  Creatinine 22.0 --> 21.0  Nephrology consulted   Payne in place   Trend daily labs  Continued IV bicarb drip at 125 cc/hr           Severe bilateral hydronephrosis  Urine retention  Hematuria  2--24  Payne in place  Urology consulted , I discussed In person with Alice Yoder, NP, monitoring for bleeding, trending HGB  Add levsin for bladder spasms   Also added as needed oral oxycodone for pain              Acute blood loss anemia:  -24  Sp 1 unit blood 2--24  HGB 6.5 --> 7.4  Check iron panel, b12, folate, reticulocyte count  Transfuse if HGB declines < 7.0 , recheck every 8 hours         Elevated blood pressure   2-23-24  Likely elevated due to pain  Will montior vitals  No current scheduled meds   Add as needed IV hydralazine           Anticipated Discharge Arrangements:   Home    PT/OT evals and PPD ordered?  Not ordered; patient not expected 
       Hospitalist Progress Note   Admit Date:  2024  7:21 PM   Name:  Saulo Ya   Age:  53 y.o.  Sex:  male  :  1970   MRN:  [id number]   Room:  810/    Presenting/Chief Complaint: No chief complaint on file.     Reason(s) for Admission: CLAUDIA (acute kidney injury) ([facility]) [N17.9]     Hospital Course:       Saulo Ya is a 53 y.o. male with medical history of GERD admitted with CLAUDIA (creatinine 22) and Acute blood loss anemia (HGB 6.5).    Transferred to DT from  for nephrology/urology    On IV bicarb drip, adjusted to NS    CTAP shows severe bilateral hydronephrosis and bladder wall thickening     Payne placed with hematuria    Urology consulted     MRI prostate with contrast ordered but held due to CLAUDIA     PSA elevated       Transfused 1 unit blood   B12, folate low      Lives alone  Sister available  Works as          Subjective & 24hr Events:       Updated with sister at bedside  Payne in place  Less abdominal pain  Eating   Is cold          Assessment & Plan:     Principal Problem:    CLAUDIA (acute kidney injury) ([facility])  Plan:   2-24-24  Creatinine 22.0 --> 21.0--> 19.7  Nephrology consulted   Payne in place   Trend daily labs  NS 75 cc/hr  I spoke with Alice Yoder NP with possible plans for bilateral ureteral stents to increase renal drainage           Severe bilateral hydronephrosis  Urine retention  Hematuria  2-24-24  Payne in place  Urology consulted , I discussed with Alice Yoder NP, monitoring for bleeding, trending HGB, possible ureteral stents, followup MRI prostate when able    levsin for bladder spasms   as needed oral oxycodone for pain              Acute blood loss anemia:  2-24-24  Sp 1 unit blood 2-22-24  HGB 6.5 --> 7.4--> 7.3  Will need  B12, folate   Transfuse if HGB declines < 7.0 , recheck every 8 hours         Elevated blood pressure   2-24-24  Likely elevated due to pain and improved   Will montior vitals  No current scheduled meds    as needed IV hydralazine 
       Hospitalist Progress Note   Admit Date:  2024  7:21 PM   Name:  Saulo Ya   Age:  53 y.o.  Sex:  male  :  1970   MRN:  [id number]   Room:  810/    Presenting/Chief Complaint: No chief complaint on file.     Reason(s) for Admission: CLAUDIA (acute kidney injury) ([facility]) [N17.9]     Hospital Course:       Saulo Ya is a 53 y.o. male with medical history of GERD admitted with CLAUDIA (creatinine 22) and Acute blood loss anemia (HGB 6.5).    Transferred to DT from  for nephrology/urology    On IV bicarb drip, adjusted to NS    CTAP shows severe bilateral hydronephrosis and bladder wall thickening     Payne placed with hematuria    Urology consulted     MRI prostate with contrast ordered but held due to CLAUDIA     PSA elevated       Transfused 3 unit blood   B12, folate low      Lives alone  Sister available  Works as          Subjective & 24hr Events:     Some hematuria  Pain improves   No edema  NPO for possible stents   Got more blood overnight   No rectal bleed  No dyspnea         Assessment & Plan:     Principal Problem:    CLAUDIA (acute kidney injury) ([facility])  Plan:   24  Creatinine 22.0 --> 21.0--> 19.7 --> 20  Nephrology consulted   Payne in place   Trend daily labs  NS 75 cc/hr  I spoke with Alice Yoder NP ,  plans for bilateral ureteral stents to increase renal drainage today           Severe bilateral hydronephrosis  Urine retention  Hematuria  2--24  Payne in place  Urology consulted , I discussed with Alice Yoder NP, ureteral stents today   followup MRI prostate when able    levsin for bladder spasms   as needed oral oxycodone for pain              Acute blood loss anemia:  24  Sp 1 unit blood 2-24 and 24  HGB 6.5 --> 7.4--> 7.3 --> 6.4 --> 8.1  Will need  B12, folate   Transfuse if HGB declines < 7.0 , recheck every 8 hours         Elevated blood pressure   -24  Likely elevated due to pain and improved   Will montior vitals  No current scheduled meds    as 
  Admit Date: 2/22/2024      Subjective:      Patient is a 53 y.o. male with medical history of GERD esophageal stricture dilation ( Nov 23 )  C/o of urinary hesitancy, difficulty emptying his bladder for about 1 year No fever or dysuria, gr0ss hematuria   Weight loss of about 30 Bls over 3 months   He had outpatient lab work done on 2/20/24 showing creatinine of 19. Repeat lab: , creatinine 22,  and hemoglobin 6.5.    CT abdomen pelvis without contrast shows severe bilateral hydronephrosis, diffuse bladder wall thickening, and enlarged prostate   Renal consult requested for CLAUDIA   Payne cath. Placed       Review of Systems  Cardio-vascular: no chest pain, no SOB  GI: no N/V/D  : no dysuria, no hematuria    Objective:     Patient Vitals for the past 8 hrs:   BP Temp Temp src Pulse Resp SpO2   02/25/24 0748 124/77 98.1 °F (36.7 °C) Oral 74 18 97 %   02/25/24 0445 124/72 98.4 °F (36.9 °C) Oral 74 18 94 %   02/25/24 0400 121/71 98.2 °F (36.8 °C) Oral 73 16 --   02/25/24 0316 117/72 98.1 °F (36.7 °C) -- 84 18 100 %   02/25/24 0235 128/75 98.2 °F (36.8 °C) Oral 78 16 99 %   02/25/24 0230 113/74 97.8 °F (36.6 °C) Oral 83 14 94 %   02/25/24 0225 121/71 98 °F (36.7 °C) -- 79 12 94 %       02/25 0701 - 02/25 1900  In: 69.7 [I.V.:69.7]  Out: -       Physical Exam:   Not in BLAISE  A/Ox 3 \Lung: clear  CV: rr, no rub  Abd: soft, not tender  Ext no edema   Payne in place with gross hematuria          Data Review   Recent Results (from the past 8 hour(s))   CBC with Auto Differential    Collection Time: 02/25/24  6:37 AM   Result Value Ref Range    WBC 6.1 4.3 - 11.1 K/uL    RBC 2.55 (L) 4.23 - 5.6 M/uL    Hemoglobin 8.1 (L) 13.6 - 17.2 g/dL    Hematocrit 24.2 (L) 41.1 - 50.3 %    MCV 94.9 82 - 102 FL    MCH 31.8 26.1 - 32.9 PG    MCHC 33.5 31.4 - 35.0 g/dL    RDW 12.5 11.9 - 14.6 %    Platelets 179 150 - 450 K/uL    MPV 9.2 (L) 9.4 - 12.3 FL    nRBC 0.00 0.0 - 0.2 K/uL    Differential Type AUTOMATED      Neutrophils % 72 
  Physician Progress Note      PATIENT:               GE OLSEN  CSN #:                  [id number]  :                       1970  ADMIT DATE:       2024 7:21 PM  DISCH DATE:  RESPONDING  PROVIDER #:        Sheldon Balderrama MD        QUERY TEXT:    Stage of Chronic Kidney Disease: Please provide further specificity, if known.    Clinical indicators include: creatinine, bun, cr, ckd  Options provided:  -- Chronic kidney disease stage 1  -- Chronic kidney disease stage 2  -- Chronic kidney disease stage 3  -- Chronic kidney disease stage 3a  -- Chronic kidney disease stage 3b  -- Chronic kidney disease stage 4  -- Chronic kidney disease stage 5  -- Chronic kidney disease stage 5, requiring dialysis  -- End stage renal disease  -- Other - I will add my own diagnosis  -- Disagree - Not applicable / Not valid  -- Disagree - Clinically Unable to determine / Unknown        PROVIDER RESPONSE TEXT:    The patient has chronic kidney disease stage 5.      Electronically signed by:  Sheldon Balderrama MD 2024 1:45 PM          
Admit Date: 2/22/2024      Subjective:     Saulo Ya is POD 1 Procedure(s):  CYSTOSCOPY BILATERAL URETERAL STENT INSERTION, BILATERAL RETROGRADE PYELOGRAM, URETERSCOPY    No new complaints.    Objective:     Patient Vitals for the past 8 hrs:   BP Temp Temp src Pulse Resp SpO2   02/26/24 0740 121/72 97.7 °F (36.5 °C) Oral 84 18 99 %   02/26/24 0423 119/74 97.3 °F (36.3 °C) -- 79 20 98 %     No intake/output data recorded.  02/24 1901 - 02/26 0700  In: 2911.3 [I.V.:2339.6]  Out: 3875 [Urine:3875]    Physical Exam:  GENERAL ASSESSMENT: alert, oriented to person, place and time, no acute distress and no anxiety, depression or agitation  Chest: normal work of breathing  CVS exam: normal rate, regular rhythm, normal S1, S2, no murmurs, rubs, clicks or gallops.  ABDOMEN: not done  Neurological exam reveals alert, oriented, normal speech, no focal findings or movement disorder noted.  FEMALE GENITOURINARY EXAM: not done  MALE GENITAL EXAM: not done    Data Review   Recent Results (from the past 24 hour(s))   Hemoglobin and Hematocrit    Collection Time: 02/25/24  4:38 PM   Result Value Ref Range    Hemoglobin 8.0 (L) 13.6 - 17.2 g/dL    Hematocrit 24.5 (L) 41.1 - 50.3 %   PLEASE READ & DOCUMENT PPD TEST IN 48 HRS    Collection Time: 02/25/24  4:40 PM   Result Value Ref Range    PPD, (POC) Negative     mm Induration 0 0 - 5 mm   Hemoglobin and Hematocrit    Collection Time: 02/25/24 10:26 PM   Result Value Ref Range    Hemoglobin 8.0 (L) 13.6 - 17.2 g/dL    Hematocrit 23.9 (L) 41.1 - 50.3 %   CBC with Auto Differential    Collection Time: 02/26/24  4:15 AM   Result Value Ref Range    WBC 7.9 4.3 - 11.1 K/uL    RBC 2.58 (L) 4.23 - 5.6 M/uL    Hemoglobin 7.9 (L) 13.6 - 17.2 g/dL    Hematocrit 24.0 (L) 41.1 - 50.3 %    MCV 93.0 82 - 102 FL    MCH 30.6 26.1 - 32.9 PG    MCHC 32.9 31.4 - 35.0 g/dL    RDW 12.4 11.9 - 14.6 %    Platelets 182 150 - 450 K/uL    MPV 9.0 (L) 9.4 - 12.3 FL    nRBC 0.00 0.0 - 0.2 K/uL    Differential 
Admit Date: 2/22/2024      Subjective:     Saulo Ya is resting in bed. No new complaints. He has been NPO since MN.    Objective:     Patient Vitals for the past 8 hrs:   BP Temp Temp src Pulse Resp SpO2   02/25/24 0748 124/77 98.1 °F (36.7 °C) Oral 74 18 97 %   02/25/24 0445 124/72 98.4 °F (36.9 °C) Oral 74 18 94 %   02/25/24 0400 121/71 98.2 °F (36.8 °C) Oral 73 16 --   02/25/24 0316 117/72 98.1 °F (36.7 °C) -- 84 18 100 %   02/25/24 0235 128/75 98.2 °F (36.8 °C) Oral 78 16 99 %   02/25/24 0230 113/74 97.8 °F (36.6 °C) Oral 83 14 94 %   02/25/24 0225 121/71 98 °F (36.7 °C) -- 79 12 94 %     02/25 0701 - 02/25 1900  In: 69.7 [I.V.:69.7]  Out: -   02/23 1901 - 02/25 0700  In: 2068.4 [I.V.:1496.8]  Out: 3450 [Urine:3450]    Physical Exam:  GENERAL ASSESSMENT: alert, oriented to person, place and time, no acute distress and no anxiety, depression or agitation  Chest:normal work of breathing  CVS exam: normal rate, regular rhythm, normal S1, S2, no murmurs, rubs, clicks or gallops.  ABDOMEN: not done  Neurological exam reveals alert, oriented, normal speech, no focal findings or movement disorder noted.  FEMALE GENITOURINARY EXAM: not done  MALE GENITAL EXAM: not done    Data Review   Recent Results (from the past 24 hour(s))   COVID-19, Rapid    Collection Time: 02/24/24  2:25 PM    Specimen: Nasopharyngeal   Result Value Ref Range    Source NASAL      SARS-CoV-2, Rapid Not detected NOTD     Hemoglobin and Hematocrit    Collection Time: 02/24/24  2:37 PM   Result Value Ref Range    Hemoglobin 7.7 (L) 13.6 - 17.2 g/dL    Hematocrit 22.6 (L) 41.1 - 50.3 %   Hemoglobin and Hematocrit    Collection Time: 02/24/24 10:17 PM   Result Value Ref Range    Hemoglobin 6.4 (LL) 13.6 - 17.2 g/dL    Hematocrit 19.6 (L) 41.1 - 50.3 %   PREPARE RBC (CROSSMATCH), 1 Units    Collection Time: 02/24/24 11:00 PM   Result Value Ref Range    History Check Historical check performed    TYPE AND SCREEN    Collection Time: 02/25/24  1:09 AM 
Admit Date: 2/22/2024    Subjective:     Patient has no new complaints.     Objective:     Patient Vitals for the past 8 hrs:   BP Temp Temp src Pulse Resp SpO2   03/01/24 1107 (!) 84/55 98.4 °F (36.9 °C) Oral 84 18 97 %   03/01/24 0723 111/61 98.6 °F (37 °C) Oral 81 18 97 %   03/01/24 0323 (!) 104/56 99 °F (37.2 °C) Axillary 88 19 96 %     No intake/output data recorded.  02/28 1901 - 03/01 0700  In: 1320 [P.O.:820]  Out: 3375 [Urine:2875]    Physical Exam:  GENERAL ASSESSMENT: alert, oriented to person, place and time, no acute distress and no anxiety, depression or agitation  Chest: Easy work of breathing  CVS exam: RRR  ABDOMEN: not done  Neurological exam reveals alert, oriented, normal speech, no focal findings or movement disorder noted.  FEMALE GENITOURINARY EXAM: not done  MALE GENITAL EXAM: interiano draining strawberry colored urine        Data Review No results found for this or any previous visit (from the past 24 hour(s)).    No results found.      Assessment:     Principal Problem:    CLAUDIA (acute kidney injury) (HCC)  Active Problems:    Acute blood loss anemia    Urine retention    Hematuria    Bilateral hydronephrosis    Elevated PSA    Bladder wall thickening    Acute renal failure on dialysis (HCC)  Resolved Problems:    * No resolved hospital problems. *    CLAUDIA on CKD with bilat hydroureteronephrosis and urinary retention. Now s/p bilateral ureteral stents and interiano placement. HD #3 yesterday.  .  Plan:   Continue with bilateral ureteral stents and interiano to drainage.  MRI prostate today followed by SANDRINE.        JORDY Ribeiro  [facility]    Phone: [phone]    I have reviewed the above note.  I agree with the HPI, exam, assessment and plan as outlined by the nurse practitioner.    Sheldon Balderrama M.D.    [facility]  [address] [address]  Phone: [phone]  Fax: [phone] 
Admit Date: 2/22/2024    Subjective:     Patient has no new complaints.     Objective:     Patient Vitals for the past 8 hrs:   BP Temp Temp src Pulse Resp SpO2 Weight   02/28/24 1200 103/71 -- -- 90 -- -- --   02/28/24 1130 (!) 100/53 -- -- 79 -- -- --   02/28/24 1100 119/79 -- -- 74 -- -- --   02/28/24 1033 113/73 97.8 °F (36.6 °C) -- 71 16 -- --   02/28/24 1008 114/65 -- -- 76 16 90 % --   02/28/24 0958 (!) 104/59 -- -- 75 16 97 % --   02/28/24 0945 113/63 -- -- 80 16 96 % --   02/28/24 0941 (!) 113/59 -- -- 77 16 96 % --   02/28/24 0935 (!) 111/59 -- -- 80 16 97 % --   02/28/24 0930 (!) 136/58 -- -- 81 15 100 % --   02/28/24 0925 115/61 -- -- 80 14 100 % --   02/28/24 0920 (!) 118/56 -- -- 80 13 98 % --   02/28/24 0915 (!) 144/70 -- -- 81 20 100 % --   02/28/24 0913 135/70 -- -- 84 20 99 % --   02/28/24 0836 117/70 97.8 °F (36.6 °C) Infrared 86 16 98 % --   02/28/24 0712 117/79 97.7 °F (36.5 °C) Oral 74 18 97 % --   02/28/24 0634 -- -- -- -- -- -- 81.6 kg (180 lb)     No intake/output data recorded.  02/26 1901 - 02/28 0700  In: 3142.9 [P.O.:240; I.V.:2902.9]  Out: 5680 [Urine:5680]    Physical Exam:  Pt in dialysis        Data Review   Recent Results (from the past 24 hour(s))   Echo (TTE) complete (PRN contrast/bubble/strain/3D)    Collection Time: 02/27/24  2:10 PM   Result Value Ref Range    LV EDV A2C 120 mL    LV EDV A4C 129 mL    LV ESV A2C 46 mL    LV ESV A4C 54 mL    IVSd 0.6 0.6 - 1.0 cm    LVIDd 6.1 (A) 4.2 - 5.9 cm    LVIDs 4.1 cm    LVOT Diameter 2.4 cm    LVOT Mean Gradient 4 mmHg    LVOT VTI 25.9 cm    LVOT Peak Velocity 1.3 m/s    LVOT Peak Gradient 7 mmHg    LVPWd 0.7 0.6 - 1.0 cm    LV E' Lateral Velocity 11 cm/s    LV E' Septal Velocity 7 cm/s    LV Ejection Fraction A2C 62 %    LV Ejection Fraction A4C 58 %    EF BP 58 55 - 100 %    LVOT Area 4.5 cm2    LVOT .1 ml    LA Minor Axis 4.9 cm    LA Major Axis 6.1 cm    LA Area 2C 16.9 cm2    LA Area 4C 21.3 cm2    LA Volume MOD A2C 46 18 - 
Admit Date: 2/22/2024    Subjective:     Patient has no new complaints. Payne removed this AM for VT.    Objective:     Patient Vitals for the past 8 hrs:   BP Temp Temp src Pulse Resp SpO2   03/03/24 1056 113/73 97.9 °F (36.6 °C) Axillary 74 18 98 %   03/03/24 0715 105/65 98.2 °F (36.8 °C) Oral 72 17 98 %     03/03 0701 - 03/03 1900  In: 900 [P.O.:900]  Out: -   03/01 1901 - 03/03 0700  In: 942.1 [I.V.:2.1]  Out: 1900 [Urine:1400]    Physical Exam:  GENERAL ASSESSMENT: alert, oriented to person, place and time, no acute distress and no anxiety, depression or agitation  Chest: Easy work of breathing  CVS exam: RRR  ABDOMEN: not done  Neurological exam reveals alert, oriented, normal speech, no focal findings or movement disorder noted.  FEMALE GENITOURINARY EXAM: not done  MALE GENITAL EXAM: not done        Data Review   Recent Results (from the past 24 hour(s))   Hemoglobin and Hematocrit    Collection Time: 03/02/24 10:23 PM   Result Value Ref Range    Hemoglobin 7.7 (L) 13.6 - 17.2 g/dL    Hematocrit 22.5 (L) 41.1 - 50.3 %   CBC with Auto Differential    Collection Time: 03/03/24  4:17 AM   Result Value Ref Range    WBC 5.4 4.3 - 11.1 K/uL    RBC 2.46 (L) 4.23 - 5.6 M/uL    Hemoglobin 7.8 (L) 13.6 - 17.2 g/dL    Hematocrit 23.1 (L) 41.1 - 50.3 %    MCV 93.9 82 - 102 FL    MCH 31.7 26.1 - 32.9 PG    MCHC 33.8 31.4 - 35.0 g/dL    RDW 12.0 11.9 - 14.6 %    Platelets 169 150 - 450 K/uL    MPV 9.0 (L) 9.4 - 12.3 FL    nRBC 0.00 0.0 - 0.2 K/uL    Differential Type AUTOMATED      Neutrophils % 64 43 - 78 %    Lymphocytes % 15 13 - 44 %    Monocytes % 13 (H) 4.0 - 12.0 %    Eosinophils % 6 0.5 - 7.8 %    Basophils % 1 0.0 - 2.0 %    Immature Granulocytes 1 0.0 - 5.0 %    Neutrophils Absolute 3.5 1.7 - 8.2 K/UL    Lymphocytes Absolute 0.8 0.5 - 4.6 K/UL    Monocytes Absolute 0.7 0.1 - 1.3 K/UL    Eosinophils Absolute 0.3 0.0 - 0.8 K/UL    Basophils Absolute 0.1 0.0 - 0.2 K/UL    Absolute Immature Granulocyte 0.1 0.0 - 0.5 
Advance Care Planning     Advance Care Planning Inpatient Note  [facility] Department    Today's Date: 2/23/2024  Unit: SFD 8 MED SURG    Received request from patient.  Upon review of chart and communication with care team, patient's decision making abilities are not in question.. Patient was/were present in the room during visit.    Goals of ACP Conversation:  Discuss advance care planning documents    Health Care Decision Makers:       Primary Decision Maker: ROMULOSATHYA - Brother/Sister - [phone]  Summary:  No Decision Maker named by patient at this time    Advance Care Planning Documents (Patient Wishes):  Healthcare Power of /Advance Directive Appointment of Health Care Agent     Assessment:      Interventions:  Encouraged ongoing ACP conversation with future decision makers and loved ones    Care Preferences Communicated:   No    Outcomes/Plan:  ACP Discussion: Postponed    Electronically signed by Chaplain Mihaela on 2/23/2024 at 3:36 PM          
Advance Care Planning     Advance Care Planning Inpatient Note  [facility] Department    Today's Date: 2/24/2024  Unit: SFD 8 MED SURG    Received request from patient.  Upon review of chart and communication with care team, patient's decision making abilities are not in question.. Patient was/were present in the room during visit.    Goals of ACP Conversation:  Discuss advance care planning documents    Health Care Decision Makers:       Primary Decision Maker: ROMULOSATHYA - Brother/Sister - [phone]  Summary:  Completed New Documents    Advance Care Planning Documents (Patient Wishes):  Healthcare Power of /Advance Directive Appointment of Health Care Agent     Assessment:      Interventions:  Assisted in the completion of documents according to patient's wishes at this time    Care Preferences Communicated:   No    Outcomes/Plan:  ACP Discussion: Completed    Electronically signed by Chaplain Mihaela on 2/24/2024 at 10:47 AM          
Assumed care from DAY Carrillo.   
Assumed care of patient. Assessment completed and documented, see docflow. Patient awake in bed. A/O in all spheres. NAD noted at present. Respirations even and non labored on RA. Patient denies pain or needs. Encouraged to call for needs. Call light within reach. Will continue to monitor.     
Assumed care of patient. Assessment completed and documented, see docflow. Patient awake, ambulating hallways with interiano bag in hand. Patient A/O in all spheres. NAD noted at present. Respirations present on RA. Will continue to monitor.     
Assumed care. SBAR received from Marcia Scruggs RN.  
Attempted to place PPD. Pt wants to wait until pain settles down to have PPD placed and requested that PPD not be done right at this time, PPD rescheduled and moved on MAR to 1700,  this was Passed along to Yanna BERNSTEIN, pt medicated for pain and spasms   
Comprehensive Nutrition Assessment    Type and Reason for Visit: Reassess  Malnutrition Screening Tool: Malnutrition Screen  Have you recently lost weight without trying?: 24 to 33 pounds (3 points)  Have you been eating poorly because of a decreased appetite?: Yes (1 point)  Malnutrition Screening Tool Score: 4    Nutrition Recommendations/Plan:   Meals and Snacks:  Diet: Continue current order  Nutrition Supplement Therapy:  Medical food supplement therapy:  Decrease to Nepro once per day (this provides 420 kcal and 19 grams protein per bottle)     Malnutrition Assessment:  Malnutrition Status: At risk for malnutrition (Comment) (reports of weight loss and poor appetite pta)       Nutrition Assessment:  Nutrition History: Pt reports his appetite has been low for for the past year; intake has been gradually declining Reports he received an esophageal dilation in October of 2023 and had a colonoscopy in Nov of 2023 and his appetite never recovered. Reports no issues with swallowing s/p esophageal dilation.      Do You Have Any Cultural, Gnosticist, or Ethnic Food Preferences?: No   Weight History: No weight hx available per EMR.   Pt reports a UBW of 215#. Current measured bed weight of 182# (2/23).   Nutrition Background:       PMH: GERD. Presented to [facility] on 2/22 with abnormal lab (Cr of 19). Admitted with an CLAUDIA and acute normocytic anemia. Later transferred to [facility] due to need for higher level of care. Findings of CLAUDIA 2/2 obstructive uropathy; possible element of chronicity per Nephrology note.   2/28: Tunneled CVC cath placed, first HD treatment  Nutrition Interval:  Pt reports an improved appetite, Eating % of meals on average. Has not consumed his Nepro. Discussed increased kcal/pro needs with HD. Pt states good understanding. Encouraged Nepro if he does not consume all of his meal. Pt agreed. Will decrease to 1x per day. Pt asked for vanilla vs wild berry. Denies N/V/D. Endorses constipation with LBM 
DC instructions given pt. Pt being DC home to self care with output HD already arranged. Pt medications, appts and instructions given and understood. Prescriptions sent home with pt. Pt awaiting ride from family and then will be DC home.                  
Informed nurse Delma that MRI Prostate exams can only be complete w/wo contrast only (Per protocol)  
Levsin 1 tab SL given for bladder spasms. Will monitor.   
Message sent to CAROLE Chambers NP with Nephrology in regards to note needed for approval of MRI with contrast on 03/01.    
Noted care is progressing towards goals    Will follow as needed 
PRN Levsin given for pt report of bladder spasms. Pt satisfied at this time. Will continue to monitor.  
Patient BRIGIDA via bed, to Dialysis.   
Patient already in dialysis. Need approval note in chart from nephrology for MRI contrast. Nurse aware.   
Patient arrived to floor during handoff report , AxOx4, no pain or distress noted, Respirations are even and unlabored on RA, blood transfusion completed during transportation via [facility], s/p transfusion vitals pending, continuous bicarb transfusion still running, patient stated he used the bathroom, dual skin assessment performed with Lorena BERNSTEIN, no needs state, call light is within reach.   
Patient failed voiding trial today. Interiano replaced. Discharge Monday with interiano. Continue flomax. Will arrange for follow up in office next week for repeat voiding trial. Patient will need to be scheduled for prostate biopsy, and will need bilateral ureteral stents removed in near future.    Patient was prepped and draped in sterile fashion. Urethra was prepped with betadine swabs. Urojet was inserted into urethra. An 18 Fr coude Interiano catheter was inserted into urethra with return of clear yellow urine. Catheter balloon was inflated with 10 cc sterile water. Patient tolerated the procedure well.     
Patient had a 7 beat run of V-tach per telemetry monitor room. Patient asymptomatic, resting in bed, denies any pain at this time. MD made aware. No new orders at this time.  
Patient in burning 10/10 pain again, medium amount blood noted in interiano, Dr. ISIDRA Luque notified, Morphine 2 mg IV once ordered, and Urology consulted for today.   
Patient pacing in room, alert and oriented times 4. No acute distress present. Respirations are even and unlabored on room air. IV intact and patent with no s/s of infection noted. Patient able to ambulate independently without assistance; no assist needed. Bedside table and call light within reach, bed is in low locked position. Patient instructed to call if assistance is needed. Patient requesting medication for cramping. Standard safety measures in place.     
Patient requested and received Levsin for c/o muscle spasms. Will continue to monitor.  
Patient resting in bed, alert and oriented, cooperative with care. Patient denies pain or distress, safety measures in place, call light within reach.  
Patient resting in bed, alert and oriented, cooperative with care. Payne in place, patent and draining. Telemetry in place. Patient denies pain or distress, safety measures in place, call light within reach.  
Patient returned from dialysis, via bed; accompanied by stretcher. Patient A/O, denies pain or needs. Will continue to monitor.  
Patient returned to room 810, via bed; accompanied by transport. Patient awake, alert and oriented. Denies pain. NAD noted. Will continue to monitor.  
Patient's 1 hour s/p 1 unit of PRBC Hgb read 7.0, admitting DO Amira notified, okay for now, will recheck hgb in AM labs  
Payne inserted per order, assisted by Danya BERNSTEIN, small blood clot and yellow urine return, 950mL measured, patient tolerated well but in 10/10 burning pain s/p insertion, Dr. ISIDRA Luque notified of pain morphine 1 mg IV once ordered.   
Per April with MRI; case bumped d/t another. MRI to notify RN when patient can complete MRI prior to dialysis.    
Perfect serve sent to MD r/t critical lab  Hgb 6.9  and  type and screen.  
Pt off floor in HD  
Pt returns to room from HD. Pt alert oriented times 3 at this time. Denies pain or distess, no RA. Right perm cath dressing clean, dry, intact. Payne draining yellow urine. Pt encouraged to call for assistance if needed call light in reach, will monitor.   
Pt taken front of hospital via WC for DC home with family. Leg bag for interiano placed    
RBC transfusion completed. Patient alert and oriented no allergic reactions noted at this time.  
RBC transfusion started.  
TRANSFER OUT- DIALYSIS    Hemodialysis treatment completed. PT ran 3.5 hours, without complications.    Patient a/ox4 and  /62  P 72       0 Kg removed per orders.     Flushed both ports with 10 mL of NS.  CVC dressing clean, dry, and intact, tego caps intact, curos caps placed.      Meds given: none.    RBCs given during dialysis: n/a    Patient to 801 after dialysis.   
Telemetry monitor showing 7 beat run of V-tach.  Patient asymptomatic, resting in bed, sister at beside. Blood pressure 118/71, pulse 77, o2 95 on room air, no pain,pressure in chest, lightheadedness, or any other symptoms noted by patient.   
This RN called to bedside by patient due to c/o lightheadedness. VS taken, reassessment complete. Patient's blood pressure 94/63 at this time with a MAP of 73. MD Simba Anderson notified via secure message. 250 mL LR bolus ordered at this time. Will reassess patient. Care ongoing.   
  02/29 1901 - 03/02 0700  In: 500   Out: 2625 [Urine:2275]  No intake/output data recorded.    Physical Exam:   Constitutional:  the patient is well developed and in no acute distress, alert and oriented   HEENT:  Sclera clear, pupils equal, oral mucosa moist  Lungs: clear bilaterally  Cardiovascular:  Regular rate, S1, S2, no Rub   Abd/GI: soft and non-tender; with positive bowel sounds.  Ext: warm without cyanosis. There is no lower leg edema.  Skin:  no jaundice or rashes  Neuro: no gross neuro deficits   Psychiatric: Calm.   : hematuria via interiano   Access: right TCC- intact      LAB  Recent Labs     02/29/24 0419 03/01/24  1312 03/02/24  0427   WBC 5.6 5.5 5.7   HGB 7.1* 7.0* 6.9*   HCT 21.7* 20.9* 20.7*    188 177       Recent Labs     02/29/24 0419 03/01/24  1312 03/02/24  0427    140 139   K 4.1 4.1 4.2    104 105   CO2 24 29 27   * 93* 70*   CREATININE 14.00* 10.30* 8.40*       No results for input(s): \"PH\", \"PCO2\", \"PO2\", \"HCO3\" in the last 72 hours.      Assessment/Plan:  (Medical Decision Making)   CLAUDIA 2/2 obstructive nephropathy, Cr peaked to 22  -bilateral ureteral stents placed 2/25  -unknown Cr baseline   -s/p TCC placed in IR 2/28  - Seen on dialysis. Next Rx planned Monday  - Outpatient slot at [facility]    2. Anemia- about stable. On MARTHA.     3. Hyperphosphatemia- on binder    4. Elevated PSA - 112.0, MRI with two concerning lesions.     JANUSZ BOUCHER MD  [facility]    
  Out: 5180 [Urine:5180]  No intake/output data recorded.    Physical Exam:   Constitutional:  the patient is well developed and in no acute distress, alert and oriented   HEENT:  Sclera clear, pupils equal, oral mucosa moist  Lungs: clear bilaterally  Cardiovascular:  Regular rate, S1, S2, no Rub   Abd/GI: soft and non-tender; with positive bowel sounds.  Ext: warm without cyanosis. There is no lower leg edema.  Skin:  no jaundice or rashes  Neuro: no gross neuro deficits   Psychiatric: Calm.   : hematuria via interiano       LAB  Recent Labs     02/25/24  0637 02/25/24  1638 02/25/24  2226 02/26/24  0415 02/27/24  0442   WBC 6.1  --   --  7.9 7.1   HGB 8.1* 8.0* 8.0* 7.9* 7.5*   HCT 24.2* 24.5* 23.9* 24.0* 23.3*     --   --  182 183       Recent Labs     02/25/24  0637 02/26/24  0415 02/27/24  0442    143 144   K 3.8 4.1 4.3    108 111   CO2 22 21 18*   * 166* 161*   CREATININE 20.00* 19.00* 18.60*   MG  --  2.5*  --        No results for input(s): \"PH\", \"PCO2\", \"PO2\", \"HCO3\" in the last 72 hours.      Assessment/Plan:  (Medical Decision Making)   CLAUDIA 2/2 obstructive nephropathy, Cr peaked to 22, trending down, azotemia , increased uop since bilateral ureteral stents placed 2/25  - no indication for acute RRT today  -unknown Cr baseline   -continue IVFs    2. Anemia- transfuse hgb < 7.0     3. Hyperphosphatemia- on binder    4. Elevated PSA - 112.0     John Gonzalez APRN - CNP  [facility]    
Absolute 0.0 0.0 - 0.2 K/UL    Absolute Immature Granulocyte 0.0 0.0 - 0.5 K/UL   Basic Metabolic Panel    Collection Time: 02/24/24  4:04 AM   Result Value Ref Range    Sodium 142 136 - 146 mmol/L    Potassium 4.2 3.5 - 5.1 mmol/L    Chloride 105 103 - 113 mmol/L    CO2 23 21 - 32 mmol/L    Anion Gap 14 (H) 2 - 11 mmol/L    Glucose 94 65 - 100 mg/dL     (H) 6 - 23 MG/DL    Creatinine 19.70 (H) 0.8 - 1.5 MG/DL    Est, Glom Filt Rate 2 (L) >60 ml/min/1.73m2    Calcium 8.8 8.3 - 10.4 MG/DL           Assessment:     Principal Problem:    CLAUDIA (acute kidney injury) (HCC)  Active Problems:    Acute blood loss anemia    Urine retention    Hematuria    Bilateral hydronephrosis    Elevated PSA    Bladder wall thickening  Resolved Problems:    * No resolved hospital problems. *      Plan:     CLAUDIA: secondary to obstructive uropathy most likely due to prostate issue   Creat uo to 22 . No baseline creat.  There is thinning of renal cortex , most likely element of chronicity   Payne cath. In place  Continue with IVF  Urology F/u     Anemia.     elevated Phos: binder       Mild acidosis     Elevated PSA: MRI pending     Tony Orellana MD                                                                                   
Results   Component Value Date/Time     02/23/2024 04:40 AM    K 3.9 02/23/2024 04:40 AM     02/23/2024 04:40 AM    CO2 21 02/23/2024 04:40 AM    BUN PENDING 02/23/2024 04:40 AM    CREATININE 21.00 02/23/2024 04:40 AM    GLUCOSE 99 02/23/2024 04:40 AM    CALCIUM 9.0 02/23/2024 04:40 AM    PHOS 9.9 02/22/2024 01:31 PM    MG 2.7 02/23/2024 04:40 AM    While K is normal, significantly elevated Cr of 21 and admission BUN of 180+ lead patient at high risk for hyperkalemia. Continue low K/Phos diet at this time. Nepro appropriate.       Current Nutrition Therapies:  ADULT DIET; Regular; Low Potassium (Less than 3000 mg/day); Low Phosphorus (Less than 1000 mg)    Current Intake:   Average Meal Intake: 1-25% Average Supplements Intake: None Ordered      Anthropometric Measures:  Height: 175.3 cm (5' 9.02\")  Current Body Wt: 82.1 kg (181 lb), Weight source: Bed Scale  BMI: 26.7, Overweight (BMI 25.0-29.9)  Admission Body Weight: 82.1 kg (181 lb) (2/23/24 bed scale)  Ideal Body Weight (Kg) (Calculated): 73 kg (160 lbs), 113.1 %  BMI Category Overweight (BMI 25.0-29.9)    Estimated Daily Nutrient Needs:  Energy (kcal/day): 6870-5842 (25-30 kcal/kg) (Kcal/kg Weight used: 82 kg Current  Protein (g/day): 67-82 (0.8-1.0 g/kg) Weight Used: (Current) 82 kg  Fluid (ml/day):   (1 ml/kcal)    Nutrition Diagnosis:   Inadequate oral intake related to renal dysfunction as evidenced by  (Cr of 22 and BUN of 180+, pt reports of limited PO intake and weight loss)    Nutrition Interventions:   Food and/or Nutrient Delivery: Continue Current Diet, Start Oral Nutrition Supplement     Coordination of Nutrition Care: Continue to monitor while inpatient      Goals:      Active Goal:  (PO to meet 50% of needs by next RD assessmnet)       Nutrition Monitoring and Evaluation:      Food/Nutrient Intake Outcomes: Food and Nutrient Intake, Supplement Intake  Physical Signs/Symptoms Outcomes: Biochemical Data, Weight, Meal Time 
[Urine:5180]    Physical Exam:   /73   Pulse 74   Temp 97.9 °F (36.6 °C) (Oral)   Resp 18   Ht 1.753 m (5' 9.02\")   Wt 84.6 kg (186 lb 9.6 oz)   SpO2 97%   BMI 27.54 kg/m²      GENERAL: No acute distress, Awake, Alert, Oriented X 3  CARDIAC: regular rate and rhythm  CHEST AND LUNG: Easy work of breathing,  ABDOMEN: soft, non tender, non-distended  : Payne with clear pink urine.         Data Review   Recent Results (from the past 24 hour(s))   CBC with Auto Differential    Collection Time: 02/27/24  4:42 AM   Result Value Ref Range    WBC 7.1 4.3 - 11.1 K/uL    RBC 2.46 (L) 4.23 - 5.6 M/uL    Hemoglobin 7.5 (L) 13.6 - 17.2 g/dL    Hematocrit 23.3 (L) 41.1 - 50.3 %    MCV 94.7 82 - 102 FL    MCH 30.5 26.1 - 32.9 PG    MCHC 32.2 31.4 - 35.0 g/dL    RDW 12.5 11.9 - 14.6 %    Platelets 183 150 - 450 K/uL    MPV 9.0 (L) 9.4 - 12.3 FL    nRBC 0.00 0.0 - 0.2 K/uL    Differential Type AUTOMATED      Neutrophils % 80 (H) 43 - 78 %    Lymphocytes % 7 (L) 13 - 44 %    Monocytes % 8 4.0 - 12.0 %    Eosinophils % 5 0.5 - 7.8 %    Basophils % 0 0.0 - 2.0 %    Immature Granulocytes 0 0.0 - 5.0 %    Neutrophils Absolute 5.6 1.7 - 8.2 K/UL    Lymphocytes Absolute 0.5 0.5 - 4.6 K/UL    Monocytes Absolute 0.6 0.1 - 1.3 K/UL    Eosinophils Absolute 0.4 0.0 - 0.8 K/UL    Basophils Absolute 0.0 0.0 - 0.2 K/UL    Absolute Immature Granulocyte 0.0 0.0 - 0.5 K/UL   Basic Metabolic Panel    Collection Time: 02/27/24  4:42 AM   Result Value Ref Range    Sodium 144 136 - 146 mmol/L    Potassium 4.3 3.5 - 5.1 mmol/L    Chloride 111 103 - 113 mmol/L    CO2 18 (L) 21 - 32 mmol/L    Anion Gap 15 (H) 2 - 11 mmol/L    Glucose 95 65 - 100 mg/dL     (H) 6 - 23 MG/DL    Creatinine 18.60 (H) 0.8 - 1.5 MG/DL    Est, Glom Filt Rate 3 (L) >60 ml/min/1.73m2    Calcium 7.5 (L) 8.3 - 10.4 MG/DL           Assessment:     Principal Problem:    CLAUDIA (acute kidney injury) (HCC)  Active Problems:    Acute blood loss anemia    Urine retention    
greater than or equal to 0.5 cc, and/or extra prostatic extension. Note: PI-RADS version 2.1 uses a 5 point scale based on the likelihood/probability that a combination of multi-parametric MRI findings on T2 weighted imaging, diffusion-weighted imaging, and dynamic contrast enhanced imaging correlates with the presence of a clinically significant cancer for each lesion.         Assessment:     Principal Problem:    CLAUDIA (acute kidney injury) (HCC)  Active Problems:    Acute blood loss anemia    Urine retention    Hematuria    Bilateral hydronephrosis    Elevated PSA    Bladder wall thickening    Acute renal failure on dialysis (HCC)  Resolved Problems:    * No resolved hospital problems. *    CLAUDIA on CKD with bilat hydroureteronephrosis and urinary retention. Now s/p bilateral ureteral stents and interiano placement.  Creatinine improving with multiple rounds of HD. MRI prostate reveals two PI-RADS 5 lesions. Constipated.  Plan:   Start flomax.  Remove interiano for voiding trial Sunday AM.  Dulcolax for constipation.  Will arrange for prostate biopsy in near future.      JORDY Ribeiro [facility]    Phone: [phone]              
urinary retention, bilateral hydronephrosis, elevated PSA and CLAUDIA on CKD.  Interiano in place and Cr slowly improving.     Plan:     -Trend Cr  -Monitor I&O's  -Keep interiano for max bladder drainage.  -No stents today.  Will continue to monitor and may need stents if hydronephrosis / fails to improve significantly but does not need at this time.   -Will need elevated PSA worked up as kidney function improves and able to get contrasted MRI scan to evaluate prostate  -Will follow       In addition to my documentation above, I have also reviewed the nurse practitioner note and personally examined the patient.  I agree with the HPI, exam, assessment and plan.                Sheldon Balderrama M.D.    [facility] [place] [address]  Phone: [phone]  Fax: [phone]        
mmHg   CBC with Auto Differential    Collection Time: 02/28/24  4:10 AM   Result Value Ref Range    WBC 6.6 4.3 - 11.1 K/uL    RBC 2.42 (L) 4.23 - 5.6 M/uL    Hemoglobin 7.4 (L) 13.6 - 17.2 g/dL    Hematocrit 23.2 (L) 41.1 - 50.3 %    MCV 95.9 82 - 102 FL    MCH 30.6 26.1 - 32.9 PG    MCHC 31.9 31.4 - 35.0 g/dL    RDW 12.5 11.9 - 14.6 %    Platelets 182 150 - 450 K/uL    MPV 9.1 (L) 9.4 - 12.3 FL    nRBC 0.00 0.0 - 0.2 K/uL    Differential Type AUTOMATED      Neutrophils % 74 43 - 78 %    Lymphocytes % 8 (L) 13 - 44 %    Monocytes % 10 4.0 - 12.0 %    Eosinophils % 6 0.5 - 7.8 %    Basophils % 1 0.0 - 2.0 %    Immature Granulocytes 1 0.0 - 5.0 %    Neutrophils Absolute 5.0 1.7 - 8.2 K/UL    Lymphocytes Absolute 0.5 0.5 - 4.6 K/UL    Monocytes Absolute 0.7 0.1 - 1.3 K/UL    Eosinophils Absolute 0.4 0.0 - 0.8 K/UL    Basophils Absolute 0.1 0.0 - 0.2 K/UL    Absolute Immature Granulocyte 0.0 0.0 - 0.5 K/UL   Basic Metabolic Panel    Collection Time: 02/28/24  4:10 AM   Result Value Ref Range    Sodium 144 136 - 146 mmol/L    Potassium 4.4 3.5 - 5.1 mmol/L    Chloride 112 103 - 113 mmol/L    CO2 18 (L) 21 - 32 mmol/L    Anion Gap 14 (H) 2 - 11 mmol/L    Glucose 91 65 - 100 mg/dL     (H) 6 - 23 MG/DL    Creatinine 18.20 (H) 0.8 - 1.5 MG/DL    Est, Glom Filt Rate 3 (L) >60 ml/min/1.73m2    Calcium 7.5 (L) 8.3 - 10.4 MG/DL   Hepatitis B Surface Antibody    Collection Time: 02/28/24  4:10 AM   Result Value Ref Range    Hep B S Ab <3.10 mIU/mL   Hepatitis Panel, Acute    Collection Time: 02/28/24  4:10 AM   Result Value Ref Range    Hep A IgM NONREACTIVE NR      Hep B Core Ab, IgM NONREACTIVE NR      Hepatitis B Surface Ag NONREACTIVE NR      Hepatitis C Ab NONREACTIVE NR     CBC with Auto Differential    Collection Time: 02/29/24  4:19 AM   Result Value Ref Range    WBC 5.6 4.3 - 11.1 K/uL    RBC 2.30 (L) 4.23 - 5.6 M/uL    Hemoglobin 7.1 (L) 13.6 - 17.2 g/dL    Hematocrit 21.7 (L) 41.1 - 50.3 %    MCV 94.3 82 - 102 
  Medication Dose Route Frequency    0.9 % sodium chloride infusion   IntraVENous PRN    tamsulosin (FLOMAX) capsule 0.4 mg  0.4 mg Oral Daily    bisacodyl (DULCOLAX) suppository 10 mg  10 mg Rectal Daily PRN    heparin (porcine) injection 5,000 Units  5,000 Units IntraVENous PRN    polyethylene glycol (GLYCOLAX) packet 17 g  17 g Oral Daily    melatonin tablet 3 mg  3 mg Oral Nightly PRN    docusate sodium (COLACE) capsule 100 mg  100 mg Oral BID    0.9 % sodium chloride infusion   IntraVENous PRN    oxyCODONE (ROXICODONE) immediate release tablet 5 mg  5 mg Oral Q8H PRN    hydrALAZINE (APRESOLINE) injection 10 mg  10 mg IntraVENous Q6H PRN    hyoscyamine (LEVSIN/SL) sublingual tablet 125 mcg  125 mcg SubLINGual Q4H PRN    0.9 % sodium chloride infusion   IntraVENous PRN    sodium chloride flush 0.9 % injection 5-40 mL  5-40 mL IntraVENous 2 times per day    sodium chloride flush 0.9 % injection 5-40 mL  5-40 mL IntraVENous PRN    0.9 % sodium chloride infusion   IntraVENous PRN    ondansetron (ZOFRAN-ODT) disintegrating tablet 4 mg  4 mg Oral Q8H PRN    Or    ondansetron (ZOFRAN) injection 4 mg  4 mg IntraVENous Q6H PRN    polyethylene glycol (GLYCOLAX) packet 17 g  17 g Oral Daily PRN    acetaminophen (TYLENOL) tablet 650 mg  650 mg Oral Q6H PRN    Or    acetaminophen (TYLENOL) suppository 650 mg  650 mg Rectal Q6H PRN       Signed:  Perez Martinez DO    Part of this note may have been written by using a voice dictation software.  The note has been proof read but may still contain some grammatical/other typographical errors.   
 4 mg IntraVENous Q6H PRN    polyethylene glycol (GLYCOLAX) packet 17 g  17 g Oral Daily PRN    acetaminophen (TYLENOL) tablet 650 mg  650 mg Oral Q6H PRN    Or    acetaminophen (TYLENOL) suppository 650 mg  650 mg Rectal Q6H PRN       Signed:  Luz Maria Go MD    Part of this note may have been written by using a voice dictation software.  The note has been proof read but may still contain some grammatical/other typographical errors.  
% injection 5-40 mL  5-40 mL IntraVENous PRN    0.9 % sodium chloride infusion   IntraVENous PRN    ondansetron (ZOFRAN-ODT) disintegrating tablet 4 mg  4 mg Oral Q8H PRN    Or    ondansetron (ZOFRAN) injection 4 mg  4 mg IntraVENous Q6H PRN    polyethylene glycol (GLYCOLAX) packet 17 g  17 g Oral Daily PRN    acetaminophen (TYLENOL) tablet 650 mg  650 mg Oral Q6H PRN    Or    acetaminophen (TYLENOL) suppository 650 mg  650 mg Rectal Q6H PRN       Signed:  Perez Martinez DO    Part of this note may have been written by using a voice dictation software.  The note has been proof read but may still contain some grammatical/other typographical errors.   
(ZOFRAN) injection 4 mg  4 mg IntraVENous Q6H PRN    polyethylene glycol (GLYCOLAX) packet 17 g  17 g Oral Daily PRN    acetaminophen (TYLENOL) tablet 650 mg  650 mg Oral Q6H PRN    Or    acetaminophen (TYLENOL) suppository 650 mg  650 mg Rectal Q6H PRN       Signed:  Perez Martinez DO    Part of this note may have been written by using a voice dictation software.  The note has been proof read but may still contain some grammatical/other typographical errors.

## 2024-03-05 NOTE — CARE COORDINATION
MSN, CM:  patient to be discharged home today with no services ordered or requested.  Patient is a new HD at [facility] with first appointment tomorrow.  Patient agrees with this discharge plan and has met all milestones for this admission.  Family to transport patient home.       03/05/24 1116   Service Assessment   Patient Orientation Alert and Oriented   Cognition Alert   Services At/After Discharge   Services At/After Discharge None   Mode of Transport at Discharge Other (see comment)  (family to transport)   Confirm Follow Up Transport Self   Condition of Participation: Discharge Planning   The Patient and/or Patient Representative was provided with a Choice of Provider? Patient   The Patient and/Or Patient Representative agree with the Discharge Plan? Yes   Freedom of Choice list was provided with basic dialogue that supports the patient's individualized plan of care/goals, treatment preferences, and shares the quality data associated with the providers?  Yes

## 2024-03-05 NOTE — DISCHARGE INSTRUCTIONS
DISCHARGE SUMMARY from Nurse    PATIENT INSTRUCTIONS:    After general anesthesia or intravenous sedation, for 24 hours or while taking prescription Narcotics:  Limit your activities  Do not drive and operate hazardous machinery  Do not make important personal or business decisions  Do  not drink alcoholic beverages  If you have not urinated within 8 hours after discharge, please contact your surgeon on call.    Report the following to your surgeon:  Excessive pain, swelling, redness or odor of or around the surgical area  Temperature over 100.5  Nausea and vomiting lasting longer than 4 hours or if unable to take medications  Any signs of decreased circulation or nerve impairment to extremity: change in color, persistent  numbness, tingling, coldness or increase pain  Any questions    What to do at Home:  Recommended activity: activity as tolerated    If you experience any of the following symptoms shortness of breath not relieved by rest, pain not relieved by medications, chest pain or pressure, temperature greater than 101, nausea, vomiting, diarrhea, or increase in weakness fatigue or swelling please follow up with MD.    *  Please give a list of your current medications to your Primary Care Provider.    *  Please update this list whenever your medications are discontinued, doses are      changed, or new medications (including over-the-counter products) are added.    *  Please carry medication information at all times in case of emergency situations.    These are general instructions for a healthy lifestyle:    No smoking/ No tobacco products/ Avoid exposure to second hand smoke  Surgeon General's Warning:  Quitting smoking now greatly reduces serious risk to your health.    Obesity, smoking, and sedentary lifestyle greatly increases your risk for illness    A healthy diet, regular physical exercise & weight monitoring are important for maintaining a healthy lifestyle    You may be retaining fluid if you have

## 2024-03-05 NOTE — PLAN OF CARE
Problem: Discharge Planning  Goal: Discharge to home or other facility with appropriate resources  Outcome: Adequate for Discharge     Problem: Safety - Adult  Goal: Free from fall injury  Outcome: Adequate for Discharge     Problem: Pain  Goal: Verbalizes/displays adequate comfort level or baseline comfort level  Outcome: Adequate for Discharge     Problem: Skin/Tissue Integrity  Goal: Absence of new skin breakdown  Description: 1.  Monitor for areas of redness and/or skin breakdown  2.  Assess vascular access sites hourly  3.  Every 4-6 hours minimum:  Change oxygen saturation probe site  4.  Every 4-6 hours:  If on nasal continuous positive airway pressure, respiratory therapy assess nares and determine need for appliance change or resting period.  Outcome: Adequate for Discharge

## 2024-03-05 NOTE — DIALYSIS
TRANSFER IN - DIALYSIS    Received patient in dialysis unit  from [facility] (unit) for ordered procedure.    Consent verified for renal replacement therapy. Procedure explained to patient, opportunity for Q&A provided. Call light given.     Patient alert and vital signs stable.  BP 96/57,  P 70  ,  0 L O2 via RA.    Hemodialysis initiated using Right CVC.  Aspirated and flushed both ports without difficulty. Dressing clean, dry and intact.  Machine settings per MD order.    Heparin 1000 unit bolus and 500 units/hr.      Will monitor during treatment.

## 2024-03-12 ENCOUNTER — OFFICE VISIT (OUTPATIENT)
Dept: UROLOGY | Age: 54
End: 2024-03-12
Payer: COMMERCIAL

## 2024-03-12 DIAGNOSIS — N40.1 BPH WITH OBSTRUCTION/LOWER URINARY TRACT SYMPTOMS: Primary | ICD-10-CM

## 2024-03-12 DIAGNOSIS — R33.9 URINE RETENTION: ICD-10-CM

## 2024-03-12 DIAGNOSIS — N17.9 AKI (ACUTE KIDNEY INJURY) (HCC): ICD-10-CM

## 2024-03-12 DIAGNOSIS — N13.8 BPH WITH OBSTRUCTION/LOWER URINARY TRACT SYMPTOMS: Primary | ICD-10-CM

## 2024-03-12 DIAGNOSIS — R97.20 ELEVATED PSA: ICD-10-CM

## 2024-03-12 PROCEDURE — 99214 OFFICE O/P EST MOD 30 MIN: CPT | Performed by: NURSE PRACTITIONER

## 2024-03-12 RX ORDER — ENEMA 19; 7 G/133ML; G/133ML
1 ENEMA RECTAL
Refills: 0 | COMMUNITY
Start: 2024-03-12 | End: 2024-03-12

## 2024-03-12 RX ORDER — CIPROFLOXACIN 250 MG/1
250 TABLET, FILM COATED ORAL 2 TIMES DAILY
Qty: 6 TABLET | Refills: 0 | Status: SHIPPED | OUTPATIENT
Start: 2024-03-12 | End: 2024-03-15

## 2024-03-12 NOTE — PROGRESS NOTES
[facility]  [address]   [address]  [place] [address]  [phone]    Saulo Ya  : 1970    Chief Complaint   Patient presents with    New Patient          HPI     Saulo Ya is a 53 y.o. male presented to the ER with abnormal labs, Cr 22 and feeling unwell for several weeks.       States that he has baseline LUTS and does not empty his bladder well.  Reports urgency, frequency, urinary incontinence, nocturia for at least 5 years.  Has never seen a urologist.  Not on any prostate/bladder medications. HD initiated.      CT A/P in ER Showed abnormal bladder wall thickening, severe bladder distension and severe bilateral hydro-ureteronephrosis with bilateral renal cortical atrophy from long-standing NAGEL.  He also had enlarged prostate and .  Urology consulted.  Payne catheter placed and initially draining gross hematuria.  Now clearing. S/p cysto, brian ureteral stent placement on 24 by Dr. Balderrama. Trilobar BPH causing complete visual obstruction at the bladder outlet, severely trabeculated Seminary tree shaped bladder concerning for chronic bladder outlet obstruction with obstruction of the bilateral ureteral orifices, bilateral hydroureteronephrosis with very tortuous bilateral ureters noted on ureteroscopy. No obvious bladder mass or ureter mass noted. Cr 9.60 at DC. Failed VT while inpt. DC home w catheter.   Lab Results   Component Value Date    .0 (H) 2024     MRI prostate showed 2 PI-RADS 5 lesions. No family hx of  CA.     Here today for fu. Feeling well. Irritative sx w catheter. He is afebrile. Cont w MWF HD at [facility] via right CW port. He is accompanied by sister today.     No BT.       Past Medical History:   Diagnosis Date    Esophageal stricture     GERD (gastroesophageal reflux disease)      Past Surgical History:   Procedure Laterality Date    CYSTOSCOPY Bilateral 2024    CYSTOSCOPY BILATERAL URETERAL STENT INSERTION, BILATERAL RETROGRADE

## 2024-03-15 ENCOUNTER — TELEPHONE (OUTPATIENT)
Dept: UROLOGY | Age: 54
End: 2024-03-15

## 2024-04-09 DIAGNOSIS — R97.20 ELEVATED PSA: Primary | ICD-10-CM

## 2024-04-09 RX ORDER — CIPROFLOXACIN 500 MG/1
500 TABLET, FILM COATED ORAL 2 TIMES DAILY
Qty: 2 TABLET | Refills: 0 | Status: SHIPPED | OUTPATIENT
Start: 2024-04-09 | End: 2024-04-10

## 2024-04-09 RX ORDER — ENEMA 19; 7 G/133ML; G/133ML
1 ENEMA RECTAL ONCE
Qty: 1 EACH | Refills: 0 | Status: SHIPPED | OUTPATIENT
Start: 2024-04-09 | End: 2024-04-09

## 2024-04-10 ENCOUNTER — OFFICE VISIT (OUTPATIENT)
Dept: UROLOGY | Age: 54
End: 2024-04-10
Payer: COMMERCIAL

## 2024-04-10 DIAGNOSIS — N40.1 BPH WITH OBSTRUCTION/LOWER URINARY TRACT SYMPTOMS: Primary | ICD-10-CM

## 2024-04-10 DIAGNOSIS — N13.8 BPH WITH OBSTRUCTION/LOWER URINARY TRACT SYMPTOMS: Primary | ICD-10-CM

## 2024-04-10 PROCEDURE — 76872 US TRANSRECTAL: CPT | Performed by: UROLOGY

## 2024-04-10 PROCEDURE — 96372 THER/PROPH/DIAG INJ SC/IM: CPT | Performed by: UROLOGY

## 2024-04-10 PROCEDURE — 55700 BIOPSY OF PROSTATE,NEEDLE/PUNCH: CPT | Performed by: UROLOGY

## 2024-04-10 PROCEDURE — A4550 SURGICAL TRAYS: HCPCS | Performed by: UROLOGY

## 2024-04-10 PROCEDURE — 51702 INSERT TEMP BLADDER CATH: CPT | Performed by: UROLOGY

## 2024-04-10 RX ORDER — GENTAMICIN SULFATE 40 MG/ML
80 INJECTION, SOLUTION INTRAMUSCULAR; INTRAVENOUS EVERY 12 HOURS
Status: SHIPPED | OUTPATIENT
Start: 2024-04-10

## 2024-04-10 RX ADMIN — GENTAMICIN SULFATE 80 MG: 40 INJECTION, SOLUTION INTRAMUSCULAR; INTRAVENOUS at 14:20

## 2024-04-10 NOTE — PROGRESS NOTES
[facility]  [address]   [address]  [place] [address]  [phone]    Saulo Ya  : 1970    HPI   53 y.o., male who presents for prostate biopsy.    Last seen 3/2024.  Recently admitted to hospital with urinary retention, Cr 22, bilateral hydro-ureteronephrosis.  Had bilateral ureteral stents placed 2024 and they remain in place.  Payne catheter also in place since hospitalization and due for change today.  Had concerning KYLAH for CAP and  in 2024 as possible source of obstruction.  Prostate biopsy recommended today.     MRI Prostate (3/2024)     IMPRESSION:  PI-RADS 5 lesion left apex peripheral zone, 2.0.     PI-RADS 5 lesion bilateral base to apex transition zone, 3.3 cm.     Chronic findings.     Lab Results   Component Value Date    .0 (H) 2024     Past Medical History:   Diagnosis Date    Esophageal stricture     GERD (gastroesophageal reflux disease)      Past Surgical History:   Procedure Laterality Date    CYSTOSCOPY Bilateral 2024    CYSTOSCOPY BILATERAL URETERAL STENT INSERTION, BILATERAL RETROGRADE PYELOGRAM, URETERSCOPY performed by Sheldon Balderrama MD at [facility] MAIN OR    ESOPHAGEAL DILATATION  2023    IR TUNNELED CATHETER PLACEMENT GREATER THAN 5 YEARS  2024    IR TUNNELED CATHETER PLACEMENT GREATER THAN 5 YEARS 2024 [facility] RADIOLOGY SPECIALS     Current Outpatient Medications   Medication Sig Dispense Refill    ciprofloxacin (CIPRO) 500 MG tablet Take 1 tablet by mouth 2 times daily for 1 day Begin AM of prostate biopsy. 2 tablet 0    tamsulosin (FLOMAX) 0.4 MG capsule Take 1 capsule by mouth daily 30 capsule 1    hyoscyamine (LEVSIN/SL) 125 MCG sublingual tablet Place 1 tablet under the tongue every 4 hours as needed for Cramping (bladder spasms) 90 tablet 0     Current Facility-Administered Medications   Medication Dose Route Frequency Provider Last Rate Last Admin    gentamicin (GARAMYCIN) injection 80 mg  80 mg IntraMUSCular

## 2024-04-18 DIAGNOSIS — C61 PROSTATE CANCER (HCC): Primary | ICD-10-CM

## 2024-04-23 NOTE — PROGRESS NOTES
heartburn.  Genitourinary:  Negative for urinary burning, hematuria, flank pain, recurrent UTIs, history of urolithiasis, nocturia, slower stream, straining, urgency, leakage w/ urge, frequent urination, incomplete emptying, erectile dysfunction, testicular pain, sexually transmitted disease, discharge and urethral stricture.  Musculoskeletal:  Negative for back pain, bone pain, arthralgias, tenderness, muscle weakness and neck pain.  Neurological:  Negative for dizziness, focal weakness, numbness, seizures and tremors.  Psychological:  Negative for depression and psychiatric problem.  Endocrine:  Negative for cold intolerance, thirst, excessive urination, fatigue and heat intolerance.  Hem/Lymphatic:  Negative for easy bleeding, easy bruising and frequent infections.      Urinalysis  UA - Dipstick  @LASTPROCAMB([id number])@    UA - Micro  WBC - 0  RBC - 0  Bacteria - 0  Epith - 0    There were no vitals taken for this visit.     GENERAL: No acute distress, Awake, Alert, Oriented X 3, Gait normal  CARDIAC: regular rate and rhythm  CHEST AND LUNG: Easy work of breathing, clear to auscultation bilaterally, no cyanosis  ABDOMEN: soft, non tender, non-distended, positive bowel sounds, no organomegaly, no palpable masses, no guarding, no rebound tenderness  SKIN: No rash, no erythema, no lacerations or abrasions, no ecchymosis  NEUROLOGIC: cranial nerves 2-12 grossly intact     MRI Prostate:   IMPRESSION:  PI-RADS 5 lesion left apex peripheral zone, 2.0.     PI-RADS 5 lesion bilateral base to apex transition zone, 3.3 cm.     Chronic findings.     PSMA PET scan.     IMPRESSION:  Multifocal PSMA avid disease throughout the prostate gland.     Pelvic te metastases.       Assessment and Plan  Prostate Cancer      I discussed with the patient and his wife that I would classify him as having unfavorable intermediate risk prostate cancer based on his pathology results.  For further staging, PSMA PET ordered and

## 2024-04-24 ENCOUNTER — OFFICE VISIT (OUTPATIENT)
Dept: UROLOGY | Age: 54
End: 2024-04-24
Payer: COMMERCIAL

## 2024-04-24 DIAGNOSIS — R97.20 ELEVATED PSA: Primary | ICD-10-CM

## 2024-04-24 DIAGNOSIS — N18.6 END STAGE RENAL DISEASE (HCC): ICD-10-CM

## 2024-04-24 PROCEDURE — 99215 OFFICE O/P EST HI 40 MIN: CPT | Performed by: UROLOGY

## 2024-04-26 ENCOUNTER — HOSPITAL ENCOUNTER (OUTPATIENT)
Dept: PET IMAGING | Age: 54
Discharge: HOME | End: 2024-04-26
Payer: COMMERCIAL

## 2024-04-26 DIAGNOSIS — C61 PROSTATE CANCER (HCC): ICD-10-CM

## 2024-04-26 PROCEDURE — 3430000000 HC RX DIAGNOSTIC RADIOPHARMACEUTICAL: Performed by: UROLOGY

## 2024-04-26 PROCEDURE — 2580000003 HC RX 258: Performed by: UROLOGY

## 2024-04-26 PROCEDURE — A9595 HC RX DIAGNOSTIC RADIOPHARMACEUTICAL: HCPCS | Performed by: UROLOGY

## 2024-04-26 PROCEDURE — 78815 PET IMAGE W/CT SKULL-THIGH: CPT

## 2024-04-26 RX ORDER — SODIUM CHLORIDE 0.9 % (FLUSH) 0.9 %
10 SYRINGE (ML) INJECTION ONCE AS NEEDED
Status: COMPLETED | OUTPATIENT
Start: 2024-04-26 | End: 2024-04-26

## 2024-04-26 RX ADMIN — PIFLUFOLASTAT F-18 8.7 MILLICURIE: 80 INJECTION INTRAVENOUS at 13:03

## 2024-04-26 RX ADMIN — SODIUM CHLORIDE, PRESERVATIVE FREE 10 ML: 5 INJECTION INTRAVENOUS at 13:03

## 2024-04-27 ASSESSMENT — ENCOUNTER SYMPTOMS
NAUSEA: 0
COUGH: 0
CONSTIPATION: 0
BACK PAIN: 0
EYE PAIN: 0
ABDOMINAL PAIN: 0
EYE DISCHARGE: 0
DIARRHEA: 0
INDIGESTION: 0
HEARTBURN: 0
VOMITING: 0
SKIN LESIONS: 0
BLOOD IN STOOL: 0
WHEEZING: 0
SHORTNESS OF BREATH: 0

## 2024-04-30 DIAGNOSIS — C61 PROSTATE CANCER (HCC): Primary | ICD-10-CM

## 2024-04-30 NOTE — RESULT ENCOUNTER NOTE
Called patient with results of PSMA PET scan.      I recommended he see Celsa or Sophia to discuss RALP and also see radiation oncology to discuss radiation to prostate gland    He wants referral to [facility] and rad onc    Melonie, pleases set up with celsa ASAP for RALP discussion.  Rad onc will call him Referral placed today    Sheldon Balderrama M.D.    [facility] [place] [address]  Phone: [phone]  Fax: [phone]

## 2024-05-08 ENCOUNTER — OFFICE VISIT (OUTPATIENT)
Dept: UROLOGY | Age: 54
End: 2024-05-08
Payer: COMMERCIAL

## 2024-05-08 DIAGNOSIS — N31.9 NEUROGENIC BLADDER: ICD-10-CM

## 2024-05-08 DIAGNOSIS — C61 PROSTATE CANCER (HCC): Primary | ICD-10-CM

## 2024-05-08 DIAGNOSIS — R33.9 URINARY RETENTION: ICD-10-CM

## 2024-05-08 PROCEDURE — 99244 OFF/OP CNSLTJ NEW/EST MOD 40: CPT | Performed by: UROLOGY

## 2024-05-08 NOTE — PROGRESS NOTES
[facility]  [address] [place] [address]  [phone]    Saulo Ya  : 1970     HPI   53 y.o., male seen in consultation for CaP and a NGB.  Pt recently admitted for AUR/CLAUDIA on 24.  Severe bilateral hydro appreciated on CT and bilateral stents placed without sig improvement in renal function.   He remains on HD through a tunneled catheter.  PSA was 112 on 24.  MRI on 3/1/24 showed pirads 5 LA and bilateral base/mid lesions.  No sig lymphadenopathy, MATEO or SVI noted.  TRUS bx performed by Dr. Balderrama on 4/10/24 showing Kelsey 5+4 CaP in a LA core and Kelsey 4+5 in 6 cores (LLB, LLM, LLA, LB, LM, RM).  PSMA on 24 shows multifocal prostate uptake with PSMA avid pelvic nodes 4mm presacral-SUV 22.8 and 3mm L internal iliac node-SUV 22.8).  Payne remains in place.  Prior R IHR with mesh.  No FH of CaP.        Past Medical History:   Diagnosis Date    Esophageal stricture     GERD (gastroesophageal reflux disease)      Past Surgical History:   Procedure Laterality Date    CYSTOSCOPY Bilateral 2024    CYSTOSCOPY BILATERAL URETERAL STENT INSERTION, BILATERAL RETROGRADE PYELOGRAM, URETERSCOPY performed by Sheldon Balderrama MD at [facility] MAIN OR    ESOPHAGEAL DILATATION  2023    IR TUNNELED CATHETER PLACEMENT GREATER THAN 5 YEARS  2024    IR TUNNELED CATHETER PLACEMENT GREATER THAN 5 YEARS 2024 [facility] RADIOLOGY SPECIALS     Current Outpatient Medications   Medication Sig Dispense Refill    tamsulosin (FLOMAX) 0.4 MG capsule Take 1 capsule by mouth daily 30 capsule 1    hyoscyamine (LEVSIN/SL) 125 MCG sublingual tablet Place 1 tablet under the tongue every 4 hours as needed for Cramping (bladder spasms) 90 tablet 0     Current Facility-Administered Medications   Medication Dose Route Frequency Provider Last Rate Last Admin    gentamicin (GARAMYCIN) injection 80 mg  80 mg IntraMUSCular Q12H Sheldon Balderrama MD   80 mg at 04/10/24 1420     No Known Allergies  Social

## 2024-05-15 ENCOUNTER — HOSPITAL ENCOUNTER (OUTPATIENT)
Dept: RADIATION ONCOLOGY | Age: 54
Setting detail: RECURRING SERIES
Discharge: HOME | End: 2024-05-18
Payer: COMMERCIAL

## 2024-05-15 VITALS
SYSTOLIC BLOOD PRESSURE: 144 MMHG | DIASTOLIC BLOOD PRESSURE: 81 MMHG | OXYGEN SATURATION: 98 % | TEMPERATURE: 98.2 F | BODY MASS INDEX: 27.33 KG/M2 | WEIGHT: 185.2 LBS | HEART RATE: 81 BPM

## 2024-05-15 DIAGNOSIS — C61 PROSTATE CANCER (HCC): Primary | ICD-10-CM

## 2024-05-15 PROCEDURE — 99211 OFF/OP EST MAY X REQ PHY/QHP: CPT

## 2024-05-15 RX ORDER — TAMSULOSIN HYDROCHLORIDE 0.4 MG/1
0.4 CAPSULE ORAL DAILY
Qty: 30 CAPSULE | Refills: 2 | Status: SHIPPED | OUTPATIENT
Start: 2024-05-15

## 2024-05-15 ASSESSMENT — PATIENT HEALTH QUESTIONNAIRE - PHQ9
SUM OF ALL RESPONSES TO PHQ9 QUESTIONS 1 & 2: 0
SUM OF ALL RESPONSES TO PHQ QUESTIONS 1-9: 0
2. FEELING DOWN, DEPRESSED OR HOPELESS: NOT AT ALL
SUM OF ALL RESPONSES TO PHQ QUESTIONS 1-9: 0
SUM OF ALL RESPONSES TO PHQ QUESTIONS 1-9: 0
1. LITTLE INTEREST OR PLEASURE IN DOING THINGS: NOT AT ALL
SUM OF ALL RESPONSES TO PHQ QUESTIONS 1-9: 0

## 2024-05-15 NOTE — PROGRESS NOTES
[facility] RADIATION ONCOLOGY CONSULTATION    Patient: Saulo Ya MRN: [id number]  SSN: [id number]    YOB: 1970  Age: 53 y.o.  Sex: male      Other Providers:  Dr. Fuller, Dr. Balderrama    CHIEF COMPLAINT: CLAUDIA    DIAGNOSIS:  Cancer Staging   Prostate cancer (HCC)  Staging form: Prostate, AJCC 8th Edition  - Clinical stage from 5/15/2024: Stage KHLOE (cT1, cN1, cM0, PSA: 112, Grade Group: 5) - Signed by Lorean Stark MD on 5/15/2024     PREVIOUS RADIATION TREATMENT:  None    HISTORY OF PRESENT ILLNESS:  Saulo Ya is a 53 y.o. male who I am seeing at the request of Dr. Balderrama.     Mr. Ya was in his usual state of health until 2/22/2024 at which time he presented to the emergency department due to creatinine of 19.  CT abdomen pelvis showed severe bilateral hydronephrosis, diffuse bladder wall thickening, and enlarged prostate.  PSA was elevated to 112.  On 2/25/2024 he underwent bilateral ureteral stent placement.  MRI prostate 2/22/2024 showed PI-RADS 5 lesion in the left apex and PI-RADS 5 lesion in the bilateral base to apical transition zone.  Biopsy 4/10/2024 showed Kelsey 5+4 = 9 in 1 core and Kelsey 4+5 equals 9 in 6 cores (10 cores total).  PSMA PET shows multifocal uptake in the prostate gland with an avid presacral and left internal iliac node. He currently denies pain. He has a interiano catheter in place and is undergoing dialysis.    PAST MEDICAL HISTORY:    Past Medical History:   Diagnosis Date    Esophageal stricture     GERD (gastroesophageal reflux disease)        The patient denies history of collagen vascular diseases, pacemaker insertion, prior radiation.     PAST SURGICAL HISTORY:   Past Surgical History:   Procedure Laterality Date    CYSTOSCOPY Bilateral 2/25/2024    CYSTOSCOPY BILATERAL URETERAL STENT INSERTION, BILATERAL RETROGRADE PYELOGRAM, URETERSCOPY performed by Sheldon Balderrama MD at [facility] MAIN OR    ESOPHAGEAL DILATATION  11/2023

## 2024-05-15 NOTE — PROGRESS NOTES
Consult Prostate Cancer.  Sister was present for consult.   Pt has Payne Catheter in place.  Aua was not completed.  Last PSA 2-23-24.   Pathology 4-10-24.  PSMA scan 4-26-24.  No previous RT history.   Pt denies pain.  Taking Flomax daily. Requesting refill.  Pt works 4-12 M-F.  Dialysis M-W-F.  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  He is concerned about managing work with RT and dialysis apts.  Pt to be presented at [facility] 5-21-24.  Referred to Medical Oncology to evaluate for systemic therapy/Lupron.  Lupron handout was given and explained.    Avelina Henry RN

## 2024-05-21 ENCOUNTER — TELEPHONE (OUTPATIENT)
Dept: UROLOGY | Age: 54
End: 2024-05-21

## 2024-05-21 DIAGNOSIS — C61 PROSTATE CANCER (HCC): Primary | ICD-10-CM

## 2024-05-21 RX ORDER — BICALUTAMIDE 50 MG/1
50 TABLET, FILM COATED ORAL DAILY
Qty: 30 TABLET | Refills: 0 | Status: SHIPPED | OUTPATIENT
Start: 2024-05-21

## 2024-05-21 NOTE — TELEPHONE ENCOUNTER
Called patient with  tumor board discussion.    Board recommendation today is to start systemic therapy and refer to medical oncology for at least 6 months.  If responds well then may consider XRT to prostate / lymph nodes     I will have him back to start lupron in next couple of weeks and referral to medical oncology placed today    Will schedule him to come to get interiano catheter + stents out in about 1 month in clinic to attempt voiding trial.  He is on dialysis but still makes some urine.     My medical assistant will contact him to schedule all of this    Sheldon Balderrama M.D.    [facility]  [address] [address]  Phone: [phone]  Fax: [phone]

## 2024-05-28 DIAGNOSIS — C61 PROSTATE CANCER (HCC): Primary | ICD-10-CM

## 2024-05-30 ENCOUNTER — NURSE ONLY (OUTPATIENT)
Dept: UROLOGY | Age: 54
End: 2024-05-30

## 2024-05-30 DIAGNOSIS — R33.9 URINARY RETENTION: Primary | ICD-10-CM

## 2024-05-30 DIAGNOSIS — Z46.6 URINARY CATHETER CHANGE REQUIRED: ICD-10-CM

## 2024-05-30 NOTE — PROGRESS NOTES
Pt came in for catheter change. 18f coude interiano catheter changed without incident. Patient tolerated procedure well.

## 2024-06-03 ENCOUNTER — CLINICAL DOCUMENTATION (OUTPATIENT)
Dept: CASE MANAGEMENT | Age: 54
End: 2024-06-03

## 2024-06-03 ASSESSMENT — SOCIAL DETERMINANTS OF HEALTH (SDOH): HOW HARD IS IT FOR YOU TO PAY FOR THE VERY BASICS LIKE FOOD, HOUSING, MEDICAL CARE, AND HEATING?: NOT HARD AT ALL

## 2024-06-03 NOTE — PROGRESS NOTES
6/3/2024  Navigation intake complete.  Reviewed role of navigation, gave contact information for navigators, discussed pathology report, any remaining work up needed, and reviewed upcoming appointments.  Patient is currently employed as a  and works evenings until midnight.   Barriers: No financial, psychosocial,or transportation barriers noted.  Lives alone but has a sister close by that assists him if needed. Pt currently on a MWF hemodialysis schedule.    Verbal permission given to speak with Yecenia Lee (sister).  Current Urologist: Hair Balderrama and Jonny  Social determinants of health and depression screen complete.  Referring Provider: Dr. Stark  Added MD and Navigator to treatment team.  Appointment with Oncology: 6/11/24    My Chart message to patient with navigation contact information and upcoming appointments.     Next planned outreach: 6/12/24, post med onc consult  Time Spent: 15 minutes

## 2024-06-06 ENCOUNTER — NURSE ONLY (OUTPATIENT)
Dept: UROLOGY | Age: 54
End: 2024-06-06
Payer: COMMERCIAL

## 2024-06-06 DIAGNOSIS — C61 PROSTATE CANCER (HCC): Primary | ICD-10-CM

## 2024-06-06 PROCEDURE — 96402 CHEMO HORMON ANTINEOPL SQ/IM: CPT | Performed by: UROLOGY

## 2024-06-06 NOTE — PROGRESS NOTES
Patient here to receive 45mg 6 month leuprolide(LupronDepot) injection.    Injection given IM in RUOQ without incident. Next injection due on/after 12/04/2024.

## 2024-06-10 NOTE — PROGRESS NOTES
NEW PATIENT INTAKE    Referral Diagnosis: Prostate cancer     Referring Provider:  Lorena Stark MD    Primary Care Provider: None    Family History of Cancer/ Hematology Disorders: None reported     Presenting Symptoms: Urgency, frequency, urinary incontinence, nocturia     Chronological History of Pertinent Events: Mr. Ya is a 53-year-old white male referred for prostate cancer.     2/22/24 - 3/5/24: Pt was admitted to [facility] for acute kidney injury with serum creatinine 22 and acute blood loss anemia with hematuria hemoglobin 6.5. He required total 2-unit PRBC transfusion during hospital stay. He required bicarbonate drip and underwent placement of dialysis catheter and hemodialysis. He was found to have significant obstructive disease with bladder outlet obstruction with significantly enlarged prostate. PSA was found to be elevated at 112.0. He required bilateral ureteral stents and Payne catheter placed by Dr. Balderrama on 2/25/24. During the weekend of March 1st, removal of Payne catheter was attempted, but pt failed voiding trial. MRI of prostate showed large masses on left and right lobe. Outpatient biopsy planned by Urology. Creatinine 9.60 at discharge. Failed voiding trial while inpatient. D/c/d home with catheter.    2/22/24: CT OF THE ABDOMEN AND PELVIS: Severe bilateral hydroureteronephrosis without obstructing radiopaque stone. Diffuse irregular bladder wall thickening and diverticula, likely hypertrophic changes. Neoplastic process not completely excluded. Urinary bladder distention. Enlarged prostate. Correlate with patient's PSA. Constellation of findings consistent in appearance with bladder outlet restriction (Epic/Imaging)    2/23/24: .0 (Epic/Labs)    2/28/24: US retroperitoneal complete: Severe bilateral hydronephrosis with cortical thinning (Epic/Imaging)    3/1/24: MRI PROSTATE W WO CONTRAST: PI-RADS 5 lesion left apex peripheral zone, 2.0. PI-RADS 5 lesion bilateral base to apex

## 2024-06-11 ENCOUNTER — OFFICE VISIT (OUTPATIENT)
Dept: ONCOLOGY | Age: 54
End: 2024-06-11
Payer: COMMERCIAL

## 2024-06-11 VITALS
DIASTOLIC BLOOD PRESSURE: 75 MMHG | WEIGHT: 187.6 LBS | TEMPERATURE: 98.1 F | RESPIRATION RATE: 16 BRPM | HEIGHT: 69 IN | HEART RATE: 88 BPM | OXYGEN SATURATION: 99 % | SYSTOLIC BLOOD PRESSURE: 111 MMHG | BODY MASS INDEX: 27.78 KG/M2

## 2024-06-11 DIAGNOSIS — C61 PROSTATE CANCER (HCC): Primary | ICD-10-CM

## 2024-06-11 DIAGNOSIS — C77.5 METASTATIC CANCER TO INTRAPELVIC LYMPH NODES (HCC): ICD-10-CM

## 2024-06-11 PROCEDURE — 99205 OFFICE O/P NEW HI 60 MIN: CPT | Performed by: INTERNAL MEDICINE

## 2024-06-11 RX ORDER — SEVELAMER CARBONATE 800 MG/1
800 TABLET, FILM COATED ORAL
COMMUNITY
Start: 2024-03-23

## 2024-06-11 RX ORDER — CALCITRIOL 0.5 UG/1
0.5 CAPSULE, LIQUID FILLED ORAL
COMMUNITY

## 2024-06-11 NOTE — PATIENT INSTRUCTIONS
Patient Information from Today's Visit    The members of your Oncology Medical Home are listed below:    Physician Provider: Jonn Fulton Medical Oncologist  Advanced Practice Clinician: Tiffani Hendrickson NP  Registered Nurse: Tino ESPINAL RN  Nurse Navigator: Joanne LAM RN  Medical Assistant: Shanita EATON CMA  :Margo VELASQUEZ  Supportive Care Services: Luisito MARIA LM    Diagnosis: Prostate Cancer    Follow Up Instructions: 6 weeks      Treatment Summary has been discussed and given to patient:No      Current Labs: N/A        Please refer to After Visit Summary or MyChart for upcoming appointment information. If you have any questions regarding your upcoming schedule please reach out to your care team through [employer] or call [phone].    Please notify your assigned Nurse Navigator of any unplanned hospital admissions or Emergency Room visits within 24 hours of discharge.    -------------------------------------------------------------------------------------------------------------------  Please call our office at [phone] if you have any  of the following symptoms:   Fever of 100.5 or greater  Chills  Shortness of breath  Swelling or pain in one leg    After office hours an answering service is available and will contact a provider for emergencies or if you are experiencing any of the above symptoms.        TINO RUIZ RN

## 2024-06-11 NOTE — PROGRESS NOTES
results found.    ASSESSMENT:   Diagnosis Orders   1. Prostate cancer (HCC)  Abiraterone Acetate 500 MG TABS    predniSONE (DELTASONE) 5 MG tablet    CBC with Auto Differential    Comprehensive Metabolic Panel    PSA, Diagnostic    Comprehensive Metabolic Panel    CBC with Auto Differential    PSA, Diagnostic        Patient Active Problem List   Diagnosis    CLAUDIA (acute kidney injury) (HCC)    Anemia    GERD (gastroesophageal reflux disease)    Hyponatremia    Hyperphosphatemia    High anion gap metabolic acidosis    Acute blood loss anemia    Urine retention    Hematuria    Bilateral hydronephrosis    Elevated PSA    Bladder wall thickening    Acute renal failure on dialysis (HCC)    Prostate cancer (HCC)           PLAN:  Lab studies and imaging studies were personally reviewed.    Pertinent old records were reviewed.     Case discussed with Dr. Balderrama and Mi at [facility].    Prostate cancer: originally presents with acute renal failure in February 2024, he was found to have severe bilateral hydronephrosis and underwent bilateral ureteral stent placement, his PSA was 112.  MRI showed two PIRADS-5 lesions in the prostate with TRUS/biopsy showing Benkelman 5+4=9 adenocarcinoma in 1 core and GS 4+5=9 in 6 cores.  PSMA PET showed uptake in the prostate as well as a presacral and internal iliac node.  He met with urology and radiation oncology, they recommended EBRT + ADT, but with a ~6 month period of ADT prior to restaging and potential radiation treatment.  He started Lupron on 6/6/24, and now presents to medical oncology to discuss additional therapy.  He is tolerating this well so far, although admittedly it is early to experience any androgen suppressing side effects.  I discussed the pathophysiology and natural history of locally advanced prostate cancer with Mr. Ya.  I agree with ADT as has already been started, I would also add abiraterone and prednisone due to the N1 disease and for maximal cytoreduction.

## 2024-06-13 RX ORDER — ABIRATERONE 500 MG/1
1000 TABLET ORAL DAILY
Qty: 60 TABLET | Refills: 5 | Status: ACTIVE | OUTPATIENT
Start: 2024-06-13

## 2024-06-13 RX ORDER — PREDNISONE 5 MG/1
TABLET ORAL
Qty: 30 TABLET | Refills: 5 | Status: SHIPPED | OUTPATIENT
Start: 2024-06-13

## 2024-06-19 ENCOUNTER — CLINICAL DOCUMENTATION (OUTPATIENT)
Dept: CASE MANAGEMENT | Age: 54
End: 2024-06-19

## 2024-06-19 NOTE — PROGRESS NOTES
Called pt for post-consult follow up. Left VM with contact information requesting a call back. Backand message sent with oncology medical home information.      1150: Received call back from pt.  NURSE NAVIGATION POST-ONCOLOGY CONSULT / TREATMENT START FOLLOW-UP    Questions regarding any of the following:    -Diagnosis/Pathology: No questions  -Additional testing/work up needed: None  -Next steps (chemo ed, port insertion, etc): Pt has received all medications and is tolerating well. Reports that he is hopeful to have his interiano removed at his appt with Dr. Balderrama tomorrow. Pt asked if he needed to be on Flomax as he ran out of refills. Advised pt to discuss the medication with Dr. Balderrama at his visit.  -Brief port insertion procedure discussion if relevant: N/A  -Community resource needs: None at this time.      Next planned outreach: Pt will follow up if any needs arise.  Time Spent: 10 minutes

## 2024-06-20 ENCOUNTER — PROCEDURE VISIT (OUTPATIENT)
Dept: UROLOGY | Age: 54
End: 2024-06-20
Payer: COMMERCIAL

## 2024-06-20 ENCOUNTER — NURSE ONLY (OUTPATIENT)
Dept: UROLOGY | Age: 54
End: 2024-06-20

## 2024-06-20 ENCOUNTER — TELEPHONE (OUTPATIENT)
Dept: UROLOGY | Age: 54
End: 2024-06-20

## 2024-06-20 DIAGNOSIS — C61 PROSTATE CANCER (HCC): Primary | ICD-10-CM

## 2024-06-20 DIAGNOSIS — R33.9 URINARY RETENTION: ICD-10-CM

## 2024-06-20 DIAGNOSIS — N13.30 BILATERAL HYDRONEPHROSIS: ICD-10-CM

## 2024-06-20 LAB
BILIRUBIN, URINE, POC: NEGATIVE
BLOOD URINE, POC: NORMAL
GLUCOSE URINE, POC: NEGATIVE
KETONES, URINE, POC: NEGATIVE
LEUKOCYTE ESTERASE, URINE, POC: NORMAL
NITRITE, URINE, POC: NEGATIVE
PH, URINE, POC: 8 (ref 4.6–8)
PROTEIN,URINE, POC: 100
PVR, POC: 357 CC
SPECIFIC GRAVITY, URINE, POC: 1.01 (ref 1–1.03)
URINALYSIS CLARITY, POC: NORMAL
URINALYSIS COLOR, POC: NORMAL
UROBILINOGEN, POC: NORMAL

## 2024-06-20 PROCEDURE — 52310 CYSTOSCOPY AND TREATMENT: CPT | Performed by: UROLOGY

## 2024-06-20 PROCEDURE — 51798 US URINE CAPACITY MEASURE: CPT | Performed by: UROLOGY

## 2024-06-20 PROCEDURE — 81003 URINALYSIS AUTO W/O SCOPE: CPT | Performed by: UROLOGY

## 2024-06-20 PROCEDURE — 99214 OFFICE O/P EST MOD 30 MIN: CPT | Performed by: UROLOGY

## 2024-06-20 RX ORDER — TAMSULOSIN HYDROCHLORIDE 0.4 MG/1
0.4 CAPSULE ORAL DAILY
Qty: 90 CAPSULE | Refills: 3 | Status: SHIPPED | OUTPATIENT
Start: 2024-06-20

## 2024-06-20 NOTE — PROGRESS NOTES
Pt came in for catheter removal. 18f interiano catheter removed without incident.  Pt instructed to push fluids, 6-8 glasses of water and if he has not voided on his own by 3:00pm to call us and come in this afternoon for reinsertion.

## 2024-06-20 NOTE — PROGRESS NOTES
appeared normal in appearance.  The prostatic urethra was severely obstructed with significant lateral lobe hypertrophy amenable to TURP.  The bladder was systematically surveyed.  +3 bladder trabeculations were seen with very large capacity consistent with likely neurogenic bladder from long standing bladder outlet obstruction.  No mucosal abnormalities were seen.  The ureteral orifices were seen in their normal orthotopic position.  Ureter stents visulized and grasped with stent grasper.  Stents removed completely.  The cystoscope was then removed under direct vision.  The patient tolerated the procedure well.    Assessment and Plan    ICD-10-CM    1. Prostate cancer (HCC)  C61 WI CYSTO W/SIMPLE REMOVAL STONE & STENT     AMB POC URINALYSIS DIP STICK AUTO W/O MICRO      2. Urinary retention  R33.9 WI CYSTO W/SIMPLE REMOVAL STONE & STENT     AMB POC URINALYSIS DIP STICK AUTO W/O MICRO     AMB POC PVR, HOLLY,POST-VOID RES,US,NON-IMAGING     tamsulosin (FLOMAX) 0.4 MG capsule      3. Bilateral hydronephrosis  N13.30 US RETROPERITONEAL COMPLETE        CAP:   Continue treatment with medical oncology for abiraterone and next lupron due 12/4/24. Tolerating treatment well without significant side effects.     Urinary Retention:   Discussed today.  Failed voiding trial today and interiano replaced.  Will keep in place and continue with monthly changes for now.  I encouraged him to consider CIC teaching with mackenzie to try to get off indwelling catheter as CIC would be better for his bladder function and reduce his UTI risk.  He wants to think about CIC for now but continue with indwelling in the interim.     Bilateral Hydronephrosis:   Stents removed today.  Will get renal US in 2-3 weeks to ensure no significant hydronephrosis.  Will call with results when available.  If US ok then will need follow up in 12/2024 with PSA prior for next lupron shot.     Patient had a complete established visit today for urinary retention follow up

## 2024-06-20 NOTE — TELEPHONE ENCOUNTER
\"Return for needs rad phone number. follow up 2-3 weeks with renal uS prior . needs monthly cath changes mackenzie.\"  Was not able to get the 2-3 weeks f/u schedule due to [facility] didn't have any availability.  Please schedule and contact pt.

## 2024-06-21 NOTE — TELEPHONE ENCOUNTER
Called pt ldvm, Per Dr. Balderrama pt does not need follow up. Dr. Balderrama will call [facility] results.

## 2024-06-26 ENCOUNTER — TELEPHONE (OUTPATIENT)
Dept: UROLOGY | Age: 54
End: 2024-06-26

## 2024-06-26 NOTE — TELEPHONE ENCOUNTER
Pt calling and states he received a message stating there was an order placed by dr lambert for 7/4, and has no idea what is could be for. I saw nothing in the chart pertaining to that message. [phone] is a great call back number

## 2024-07-08 SDOH — HEALTH STABILITY: PHYSICAL HEALTH: ON AVERAGE, HOW MANY DAYS PER WEEK DO YOU ENGAGE IN MODERATE TO STRENUOUS EXERCISE (LIKE A BRISK WALK)?: 2 DAYS

## 2024-07-08 SDOH — HEALTH STABILITY: PHYSICAL HEALTH: ON AVERAGE, HOW MANY MINUTES DO YOU ENGAGE IN EXERCISE AT THIS LEVEL?: 30 MIN

## 2024-07-09 ENCOUNTER — OFFICE VISIT (OUTPATIENT)
Dept: INTERNAL MEDICINE CLINIC | Facility: CLINIC | Age: 54
End: 2024-07-09
Payer: COMMERCIAL

## 2024-07-09 VITALS
WEIGHT: 187 LBS | SYSTOLIC BLOOD PRESSURE: 110 MMHG | BODY MASS INDEX: 27.7 KG/M2 | HEIGHT: 69 IN | HEART RATE: 98 BPM | OXYGEN SATURATION: 98 % | DIASTOLIC BLOOD PRESSURE: 80 MMHG | TEMPERATURE: 97.9 F

## 2024-07-09 DIAGNOSIS — Z13.220 ENCOUNTER FOR SCREENING FOR LIPID DISORDER: ICD-10-CM

## 2024-07-09 DIAGNOSIS — Z99.2 STAGE 5 CHRONIC KIDNEY DISEASE ON CHRONIC DIALYSIS (HCC): ICD-10-CM

## 2024-07-09 DIAGNOSIS — Z76.89 ENCOUNTER TO ESTABLISH CARE: ICD-10-CM

## 2024-07-09 DIAGNOSIS — C61 PROSTATE CANCER (HCC): ICD-10-CM

## 2024-07-09 DIAGNOSIS — N18.6 STAGE 5 CHRONIC KIDNEY DISEASE ON CHRONIC DIALYSIS (HCC): ICD-10-CM

## 2024-07-09 DIAGNOSIS — Z79.899 OTHER LONG TERM (CURRENT) DRUG THERAPY: ICD-10-CM

## 2024-07-09 DIAGNOSIS — K21.9 GASTROESOPHAGEAL REFLUX DISEASE, UNSPECIFIED WHETHER ESOPHAGITIS PRESENT: Primary | ICD-10-CM

## 2024-07-09 PROBLEM — E87.1 HYPONATREMIA: Status: RESOLVED | Noted: 2024-02-22 | Resolved: 2024-07-09

## 2024-07-09 PROBLEM — E87.29 HIGH ANION GAP METABOLIC ACIDOSIS: Status: RESOLVED | Noted: 2024-02-22 | Resolved: 2024-07-09

## 2024-07-09 PROBLEM — N17.9 AKI (ACUTE KIDNEY INJURY) (HCC): Status: RESOLVED | Noted: 2024-02-22 | Resolved: 2024-07-09

## 2024-07-09 PROBLEM — D62 ACUTE BLOOD LOSS ANEMIA: Status: RESOLVED | Noted: 2024-02-23 | Resolved: 2024-07-09

## 2024-07-09 PROBLEM — N13.30 BILATERAL HYDRONEPHROSIS: Status: RESOLVED | Noted: 2024-02-23 | Resolved: 2024-07-09

## 2024-07-09 PROBLEM — N17.9 ACUTE RENAL FAILURE ON DIALYSIS (HCC): Status: RESOLVED | Noted: 2024-02-28 | Resolved: 2024-07-09

## 2024-07-09 PROBLEM — E83.39 HYPERPHOSPHATEMIA: Status: RESOLVED | Noted: 2024-02-22 | Resolved: 2024-07-09

## 2024-07-09 PROCEDURE — 99214 OFFICE O/P EST MOD 30 MIN: CPT | Performed by: INTERNAL MEDICINE

## 2024-07-11 ENCOUNTER — LAB (OUTPATIENT)
Dept: INTERNAL MEDICINE CLINIC | Facility: CLINIC | Age: 54
End: 2024-07-11

## 2024-07-11 DIAGNOSIS — Z79.899 OTHER LONG TERM (CURRENT) DRUG THERAPY: ICD-10-CM

## 2024-07-11 DIAGNOSIS — Z76.89 ENCOUNTER TO ESTABLISH CARE: ICD-10-CM

## 2024-07-11 DIAGNOSIS — Z13.220 ENCOUNTER FOR SCREENING FOR LIPID DISORDER: ICD-10-CM

## 2024-07-11 LAB
ALBUMIN SERPL-MCNC: 3.7 G/DL (ref 3.5–5)
ALBUMIN/GLOB SERPL: 1.1 (ref 1–1.9)
ALP SERPL-CCNC: 72 U/L (ref 40–129)
ALT SERPL-CCNC: 14 U/L (ref 12–65)
ANION GAP SERPL CALC-SCNC: 15 MMOL/L (ref 9–18)
AST SERPL-CCNC: 22 U/L (ref 15–37)
BASOPHILS # BLD: 0.1 K/UL (ref 0–0.2)
BASOPHILS NFR BLD: 2 % (ref 0–2)
BILIRUB SERPL-MCNC: 0.4 MG/DL (ref 0–1.2)
BUN SERPL-MCNC: 38 MG/DL (ref 6–23)
CALCIUM SERPL-MCNC: 9.6 MG/DL (ref 8.8–10.2)
CHLORIDE SERPL-SCNC: 97 MMOL/L (ref 98–107)
CHOLEST SERPL-MCNC: 196 MG/DL (ref 0–200)
CO2 SERPL-SCNC: 28 MMOL/L (ref 20–28)
CREAT SERPL-MCNC: 6.21 MG/DL (ref 0.8–1.3)
DIFFERENTIAL METHOD BLD: ABNORMAL
EOSINOPHIL # BLD: 0.2 K/UL (ref 0–0.8)
EOSINOPHIL NFR BLD: 4 % (ref 0.5–7.8)
ERYTHROCYTE [DISTWIDTH] IN BLOOD BY AUTOMATED COUNT: 15.6 % (ref 11.9–14.6)
GLOBULIN SER CALC-MCNC: 3.2 G/DL (ref 2.3–3.5)
GLUCOSE SERPL-MCNC: 92 MG/DL (ref 70–99)
HCT VFR BLD AUTO: 35.1 % (ref 41.1–50.3)
HDLC SERPL-MCNC: 43 MG/DL (ref 40–60)
HDLC SERPL: 4.5 (ref 0–5)
HGB BLD-MCNC: 11 G/DL (ref 13.6–17.2)
IMM GRANULOCYTES # BLD AUTO: 0 K/UL (ref 0–0.5)
IMM GRANULOCYTES NFR BLD AUTO: 1 % (ref 0–5)
LDLC SERPL CALC-MCNC: 125 MG/DL (ref 0–100)
LYMPHOCYTES # BLD: 1 K/UL (ref 0.5–4.6)
LYMPHOCYTES NFR BLD: 16 % (ref 13–44)
MCH RBC QN AUTO: 29.8 PG (ref 26.1–32.9)
MCHC RBC AUTO-ENTMCNC: 31.3 G/DL (ref 31.4–35)
MCV RBC AUTO: 95.1 FL (ref 82–102)
MONOCYTES # BLD: 0.5 K/UL (ref 0.1–1.3)
MONOCYTES NFR BLD: 8 % (ref 4–12)
NEUTS SEG # BLD: 4.4 K/UL (ref 1.7–8.2)
NEUTS SEG NFR BLD: 69 % (ref 43–78)
NRBC # BLD: 0 K/UL (ref 0–0.2)
PLATELET # BLD AUTO: 210 K/UL (ref 150–450)
PMV BLD AUTO: 9.1 FL (ref 9.4–12.3)
POTASSIUM SERPL-SCNC: 4.4 MMOL/L (ref 3.5–5.1)
PROT SERPL-MCNC: 6.9 G/DL (ref 6.3–8.2)
RBC # BLD AUTO: 3.69 M/UL (ref 4.23–5.6)
SODIUM SERPL-SCNC: 140 MMOL/L (ref 136–145)
TRIGL SERPL-MCNC: 139 MG/DL (ref 0–150)
VLDLC SERPL CALC-MCNC: 28 MG/DL (ref 6–23)
WBC # BLD AUTO: 6.2 K/UL (ref 4.3–11.1)

## 2024-07-12 NOTE — RESULT ENCOUNTER NOTE
Patient's electrolytes are stable, his kidney function as we already know, was low with an EGFR of 10 and creatinine of 6.21.  His lipid panel showed an elevated LDL cholesterol, he should continue focusing on eating healthy and getting enough exercise.  His hemoglobin was 11.0 which is much better than it was 4 months ago.  He should continue to go to dialysis as scheduled and taking his medications as prescribed.
25
30
20
30
25
30

## 2024-07-18 ENCOUNTER — NURSE ONLY (OUTPATIENT)
Dept: UROLOGY | Age: 54
End: 2024-07-18
Payer: COMMERCIAL

## 2024-07-18 DIAGNOSIS — R33.9 URINARY RETENTION: Primary | ICD-10-CM

## 2024-07-18 DIAGNOSIS — Z46.6 URINARY CATHETER CHANGE REQUIRED: ICD-10-CM

## 2024-07-18 PROCEDURE — 51702 INSERT TEMP BLADDER CATH: CPT | Performed by: UROLOGY

## 2024-07-19 ENCOUNTER — CLINICAL DOCUMENTATION (OUTPATIENT)
Dept: CASE MANAGEMENT | Age: 54
End: 2024-07-19

## 2024-07-22 DIAGNOSIS — C61 PROSTATE CANCER (HCC): ICD-10-CM

## 2024-07-22 RX ORDER — PREDNISONE 5 MG/1
TABLET ORAL
Qty: 30 TABLET | Refills: 5 | OUTPATIENT
Start: 2024-07-22

## 2024-07-30 ENCOUNTER — HOSPITAL ENCOUNTER (OUTPATIENT)
Dept: LAB | Age: 54
Discharge: HOME | End: 2024-08-02
Payer: COMMERCIAL

## 2024-07-30 ENCOUNTER — OFFICE VISIT (OUTPATIENT)
Dept: ONCOLOGY | Age: 54
End: 2024-07-30
Payer: COMMERCIAL

## 2024-07-30 VITALS
OXYGEN SATURATION: 97 % | DIASTOLIC BLOOD PRESSURE: 80 MMHG | WEIGHT: 187 LBS | HEIGHT: 69 IN | BODY MASS INDEX: 27.7 KG/M2 | SYSTOLIC BLOOD PRESSURE: 123 MMHG | RESPIRATION RATE: 18 BRPM | TEMPERATURE: 98.3 F | HEART RATE: 80 BPM

## 2024-07-30 DIAGNOSIS — C61 PROSTATE CANCER (HCC): Primary | ICD-10-CM

## 2024-07-30 DIAGNOSIS — C61 PROSTATE CANCER (HCC): ICD-10-CM

## 2024-07-30 DIAGNOSIS — R23.2 HOT FLASH IN MALE: ICD-10-CM

## 2024-07-30 DIAGNOSIS — Z99.2 STAGE 5 CHRONIC KIDNEY DISEASE ON CHRONIC DIALYSIS (HCC): ICD-10-CM

## 2024-07-30 DIAGNOSIS — R53.83 FATIGUE DUE TO TREATMENT: ICD-10-CM

## 2024-07-30 DIAGNOSIS — C77.5 METASTATIC CANCER TO INTRAPELVIC LYMPH NODES (HCC): ICD-10-CM

## 2024-07-30 DIAGNOSIS — N18.6 STAGE 5 CHRONIC KIDNEY DISEASE ON CHRONIC DIALYSIS (HCC): ICD-10-CM

## 2024-07-30 LAB
ALBUMIN SERPL-MCNC: 3.8 G/DL (ref 3.5–5)
ALBUMIN/GLOB SERPL: 1.1 (ref 1–1.9)
ALP SERPL-CCNC: 89 U/L (ref 40–129)
ALT SERPL-CCNC: 24 U/L (ref 12–65)
ANION GAP SERPL CALC-SCNC: 15 MMOL/L (ref 9–18)
AST SERPL-CCNC: 16 U/L (ref 15–37)
BASOPHILS # BLD: 0.1 K/UL (ref 0–0.2)
BASOPHILS NFR BLD: 2 % (ref 0–2)
BILIRUB SERPL-MCNC: 0.5 MG/DL (ref 0–1.2)
BUN SERPL-MCNC: 38 MG/DL (ref 6–23)
CALCIUM SERPL-MCNC: 9.7 MG/DL (ref 8.8–10.2)
CHLORIDE SERPL-SCNC: 98 MMOL/L (ref 98–107)
CO2 SERPL-SCNC: 27 MMOL/L (ref 20–28)
CREAT SERPL-MCNC: 5.94 MG/DL (ref 0.8–1.3)
DIFFERENTIAL METHOD BLD: ABNORMAL
EOSINOPHIL # BLD: 0.3 K/UL (ref 0–0.8)
EOSINOPHIL NFR BLD: 8 % (ref 0.5–7.8)
ERYTHROCYTE [DISTWIDTH] IN BLOOD BY AUTOMATED COUNT: 15.5 % (ref 11.9–14.6)
GLOBULIN SER CALC-MCNC: 3.5 G/DL (ref 2.3–3.5)
GLUCOSE SERPL-MCNC: 84 MG/DL (ref 70–99)
HCT VFR BLD AUTO: 37.5 % (ref 41.1–50.3)
HGB BLD-MCNC: 12.5 G/DL (ref 13.6–17.2)
IMM GRANULOCYTES # BLD AUTO: 0 K/UL (ref 0–0.5)
IMM GRANULOCYTES NFR BLD AUTO: 1 % (ref 0–5)
LYMPHOCYTES # BLD: 1 K/UL (ref 0.5–4.6)
LYMPHOCYTES NFR BLD: 24 % (ref 13–44)
MCH RBC QN AUTO: 30.1 PG (ref 26.1–32.9)
MCHC RBC AUTO-ENTMCNC: 33.3 G/DL (ref 31.4–35)
MCV RBC AUTO: 90.4 FL (ref 82–102)
MONOCYTES # BLD: 0.4 K/UL (ref 0.1–1.3)
MONOCYTES NFR BLD: 10 % (ref 4–12)
NEUTS SEG # BLD: 2.2 K/UL (ref 1.7–8.2)
NEUTS SEG NFR BLD: 55 % (ref 43–78)
NRBC # BLD: 0 K/UL (ref 0–0.2)
PLATELET # BLD AUTO: 200 K/UL (ref 150–450)
PMV BLD AUTO: 8.8 FL (ref 9.4–12.3)
POTASSIUM SERPL-SCNC: 3.9 MMOL/L (ref 3.5–5.1)
PROT SERPL-MCNC: 7.2 G/DL (ref 6.3–8.2)
PSA SERPL-MCNC: 3.3 NG/ML (ref 0–4)
RBC # BLD AUTO: 4.15 M/UL (ref 4.23–5.6)
SODIUM SERPL-SCNC: 140 MMOL/L (ref 136–145)
WBC # BLD AUTO: 4 K/UL (ref 4.3–11.1)

## 2024-07-30 PROCEDURE — 80053 COMPREHEN METABOLIC PANEL: CPT

## 2024-07-30 PROCEDURE — 85025 COMPLETE CBC W/AUTO DIFF WBC: CPT

## 2024-07-30 PROCEDURE — 84153 ASSAY OF PSA TOTAL: CPT

## 2024-07-30 PROCEDURE — 99214 OFFICE O/P EST MOD 30 MIN: CPT | Performed by: NURSE PRACTITIONER

## 2024-07-30 PROCEDURE — 36415 COLL VENOUS BLD VENIPUNCTURE: CPT

## 2024-07-30 NOTE — PROGRESS NOTES
[facility]: Office Visit Established Patient     Chief Complaint:    Chief Complaint   Patient presents with    Follow-up         History of Present Illness:  Mr. Ya is a 53 y.o. male who presents today for evaluation regarding prostate cancer.  He originally presents with acute renal failure in February 2024, he was found to have severe bilateral hydronephrosis and underwent bilateral ureteral stent placement, his PSA was 112.  MRI showed two PIRADS-5 lesions in the prostate with TRUS/biopsy showing Kelsey 5+4=9 adenocarcinoma in 1 core and GS 4+5=9 in 6 cores.  PSMA PET showed uptake in the prostate as well as a presacral and internal iliac node.  He met with urology and radiation oncology, they recommended EBRT + ADT, but with a ~6 month period of ADT prior to restaging and potential radiation treatment.  He started Lupron on 6/6/24 and then presented to medical oncology to discuss additional therapy.  I discussed the pathophysiology and natural history of locally advanced prostate cancer with Mr. Ya.  I agree with ADT as has already been started, I would also add abiraterone and prednisone due to the N1 disease and for maximal cytoreduction.     He returns today regarding his history of prostate cancer. He has been on abiraterone and prednisone now for about a month and is tolerating this well overall. He has some fatigue but this remains manageable, ECOG 0. Appetite is good. He has no pain. Hot flashes are mild and tolerable. He has no bowel issues. No dyspnea, swelling. No recent fevers or infectious symptoms. He continues dialysis three time a week.     Review of Systems:  Constitutional: Negative.   HENT: Negative.   Eyes: Negative.   Respiratory: Negative.   Cardiovascular: Negative.   Gastrointestinal: Negative.   Genitourinary: Negative.   Musculoskeletal: Negative.   Skin: Negative.   Neurological: Negative.   Endo/Heme/Allergies: Negative.   Psychiatric/Behavioral:

## 2024-08-03 ENCOUNTER — HOSPITAL ENCOUNTER (OUTPATIENT)
Dept: ULTRASOUND IMAGING | Age: 54
End: 2024-08-03
Attending: UROLOGY
Payer: COMMERCIAL

## 2024-08-03 DIAGNOSIS — N13.30 BILATERAL HYDRONEPHROSIS: ICD-10-CM

## 2024-08-03 PROCEDURE — 76770 US EXAM ABDO BACK WALL COMP: CPT

## 2024-08-06 NOTE — RESULT ENCOUNTER NOTE
Mr. Ya, your kidney ultrasound looks very good.  The swelling on your kidneys is MUCH better with the catheter.  I will have my medical assistant reach out to you to schedule your next prostate cancer shot in 12/2024 with a PSA prior to appointment.  We will continue with monthly catheter changes for now.     Melonie, please set up follow up with me in 12/2024 with PSA prior to appointment for next lupron shot    Best Regards,  Dr. Balderrama

## 2024-08-18 DIAGNOSIS — C61 PROSTATE CANCER (HCC): ICD-10-CM

## 2024-08-19 RX ORDER — PREDNISONE 5 MG/1
TABLET ORAL
Qty: 30 TABLET | Refills: 5 | OUTPATIENT
Start: 2024-08-19

## 2024-08-19 RX ORDER — ABIRATERONE 500 MG/1
1000 TABLET ORAL DAILY
Qty: 60 TABLET | Refills: 5 | OUTPATIENT
Start: 2024-08-19

## 2024-08-22 ENCOUNTER — NURSE ONLY (OUTPATIENT)
Dept: UROLOGY | Age: 54
End: 2024-08-22
Payer: COMMERCIAL

## 2024-08-22 DIAGNOSIS — Z46.6 URINARY CATHETER CHANGE REQUIRED: ICD-10-CM

## 2024-08-22 DIAGNOSIS — R33.9 URINARY RETENTION: Primary | ICD-10-CM

## 2024-08-22 PROCEDURE — 51702 INSERT TEMP BLADDER CATH: CPT | Performed by: UROLOGY

## 2024-09-17 ENCOUNTER — TELEPHONE (OUTPATIENT)
Dept: ONCOLOGY | Age: 54
End: 2024-09-17

## 2024-09-26 ENCOUNTER — NURSE ONLY (OUTPATIENT)
Dept: UROLOGY | Age: 54
End: 2024-09-26
Payer: COMMERCIAL

## 2024-09-26 DIAGNOSIS — R33.9 URINARY RETENTION: Primary | ICD-10-CM

## 2024-09-26 DIAGNOSIS — Z46.6 URINARY CATHETER CHANGE REQUIRED: ICD-10-CM

## 2024-09-26 PROCEDURE — 51702 INSERT TEMP BLADDER CATH: CPT | Performed by: UROLOGY

## 2024-10-01 NOTE — PROGRESS NOTES
able to fully predict if/when he would develop symptoms and the progression of his disease.     Finally, we discussed the implications of the Genetic Information Nondiscrimination Act (INOCENCIO) of 2008. This law provides federal protection from genetic discrimination in regards to health insurance and employment. INOCENCIO prevents health insurance companies from using genetic information when making decisions regarding eligibility or premiums. In addition, this law also protects against genetic discrimination by most employers. INOCENCIO does not apply to government employees, members of the , or to employers with fewer than 15 employees. The other main limitation of INOCENCIO is that the law does not cover life insurance, long-term care insurance or disability insurance. Additionally, INOCENCIO does not protect an individual if they already have the disease; it only protects an individual that has a genetic predisposition to a disease.     GENETIC TESTING FOR HEREDITARY CANCER SYNDROMES: We discussed that genetic testing for hereditary cancer syndromes can be done by looking at one specific gene, a few genes related to a specific type of cancer, or many genes related to common hereditary cancers.  All of these testing options look for misspellings within the genes (called sequence variants) and any missing or extra pieces of genetic material in these genes (called deletions and duplications).      After reviewing the benefits, risks, and limitations of testing, Saulo consented to genetic testing for a hereditary cancer predisposition through [facility]. The panel chosen is called [employer] and analyzes 71 genes that are commonly associated with hereditary cancer susceptibility syndromes. We discussed that [facility] will bill Saulo's insurance directly, and that the laboratory will contact Saulo directly in order to discuss estimated out of pocket cost, if over $100, for the testing. If the patient is not comfortable with

## 2024-10-09 ENCOUNTER — HOSPITAL ENCOUNTER (OUTPATIENT)
Dept: LAB | Age: 54
Discharge: HOME | End: 2024-10-09
Payer: COMMERCIAL

## 2024-10-09 ENCOUNTER — OFFICE VISIT (OUTPATIENT)
Dept: ONCOLOGY | Age: 54
End: 2024-10-09
Payer: COMMERCIAL

## 2024-10-09 VITALS
SYSTOLIC BLOOD PRESSURE: 125 MMHG | HEIGHT: 69 IN | BODY MASS INDEX: 29.03 KG/M2 | HEART RATE: 80 BPM | WEIGHT: 196 LBS | DIASTOLIC BLOOD PRESSURE: 78 MMHG | OXYGEN SATURATION: 97 % | TEMPERATURE: 97.6 F | RESPIRATION RATE: 12 BRPM

## 2024-10-09 DIAGNOSIS — C61 PROSTATE CANCER (HCC): ICD-10-CM

## 2024-10-09 DIAGNOSIS — N18.6 STAGE 5 CHRONIC KIDNEY DISEASE ON CHRONIC DIALYSIS (HCC): ICD-10-CM

## 2024-10-09 DIAGNOSIS — Z99.2 STAGE 5 CHRONIC KIDNEY DISEASE ON CHRONIC DIALYSIS (HCC): ICD-10-CM

## 2024-10-09 DIAGNOSIS — C61 PROSTATE CANCER (HCC): Primary | ICD-10-CM

## 2024-10-09 DIAGNOSIS — C77.5 METASTATIC CANCER TO INTRAPELVIC LYMPH NODES (HCC): ICD-10-CM

## 2024-10-09 LAB
ALBUMIN SERPL-MCNC: 3.7 G/DL (ref 3.5–5)
ALBUMIN/GLOB SERPL: 1 (ref 1–1.9)
ALP SERPL-CCNC: 84 U/L (ref 40–129)
ALT SERPL-CCNC: 7 U/L (ref 8–55)
ANION GAP SERPL CALC-SCNC: 18 MMOL/L (ref 9–18)
AST SERPL-CCNC: 14 U/L (ref 15–37)
BASOPHILS # BLD: 0.1 K/UL (ref 0–0.2)
BASOPHILS NFR BLD: 2 % (ref 0–2)
BILIRUB SERPL-MCNC: 0.5 MG/DL (ref 0–1.2)
BUN SERPL-MCNC: 51 MG/DL (ref 6–23)
CALCIUM SERPL-MCNC: 9.7 MG/DL (ref 8.8–10.2)
CHLORIDE SERPL-SCNC: 100 MMOL/L (ref 98–107)
CO2 SERPL-SCNC: 21 MMOL/L (ref 20–28)
CREAT SERPL-MCNC: 7.88 MG/DL (ref 0.8–1.3)
DIFFERENTIAL METHOD BLD: ABNORMAL
EOSINOPHIL # BLD: 0.1 K/UL (ref 0–0.8)
EOSINOPHIL NFR BLD: 2 % (ref 0.5–7.8)
ERYTHROCYTE [DISTWIDTH] IN BLOOD BY AUTOMATED COUNT: 14.3 % (ref 11.9–14.6)
GLOBULIN SER CALC-MCNC: 3.8 G/DL (ref 2.3–3.5)
GLUCOSE SERPL-MCNC: 105 MG/DL (ref 70–99)
HCT VFR BLD AUTO: 38.1 % (ref 41.1–50.3)
HGB BLD-MCNC: 13 G/DL (ref 13.6–17.2)
IMM GRANULOCYTES # BLD AUTO: 0 K/UL (ref 0–0.5)
IMM GRANULOCYTES NFR BLD AUTO: 0 % (ref 0–5)
LYMPHOCYTES # BLD: 1 K/UL (ref 0.5–4.6)
LYMPHOCYTES NFR BLD: 18 % (ref 13–44)
MCH RBC QN AUTO: 31.3 PG (ref 26.1–32.9)
MCHC RBC AUTO-ENTMCNC: 34.1 G/DL (ref 31.4–35)
MCV RBC AUTO: 91.8 FL (ref 82–102)
MONOCYTES # BLD: 0.4 K/UL (ref 0.1–1.3)
MONOCYTES NFR BLD: 7 % (ref 4–12)
NEUTS SEG # BLD: 4 K/UL (ref 1.7–8.2)
NEUTS SEG NFR BLD: 71 % (ref 43–78)
NRBC # BLD: 0 K/UL (ref 0–0.2)
PLATELET # BLD AUTO: 215 K/UL (ref 150–450)
PMV BLD AUTO: 8.4 FL (ref 9.4–12.3)
POTASSIUM SERPL-SCNC: 4.7 MMOL/L (ref 3.5–5.1)
PROT SERPL-MCNC: 7.5 G/DL (ref 6.3–8.2)
PSA SERPL-MCNC: 0.8 NG/ML (ref 0–4)
RBC # BLD AUTO: 4.15 M/UL (ref 4.23–5.6)
SODIUM SERPL-SCNC: 139 MMOL/L (ref 136–145)
WBC # BLD AUTO: 5.7 K/UL (ref 4.3–11.1)

## 2024-10-09 PROCEDURE — 85025 COMPLETE CBC W/AUTO DIFF WBC: CPT

## 2024-10-09 PROCEDURE — 80053 COMPREHEN METABOLIC PANEL: CPT

## 2024-10-09 PROCEDURE — 36415 COLL VENOUS BLD VENIPUNCTURE: CPT

## 2024-10-09 PROCEDURE — 99214 OFFICE O/P EST MOD 30 MIN: CPT | Performed by: INTERNAL MEDICINE

## 2024-10-09 PROCEDURE — 84153 ASSAY OF PSA TOTAL: CPT

## 2024-10-09 RX ORDER — ABIRATERONE 500 MG/1
1000 TABLET ORAL DAILY
Qty: 60 TABLET | Refills: 5 | Status: ACTIVE | OUTPATIENT
Start: 2024-10-09

## 2024-10-09 RX ORDER — PREDNISONE 5 MG/1
TABLET ORAL
Qty: 30 TABLET | Refills: 5 | Status: SHIPPED | OUTPATIENT
Start: 2024-10-09

## 2024-10-09 ASSESSMENT — PATIENT HEALTH QUESTIONNAIRE - PHQ9
SUM OF ALL RESPONSES TO PHQ QUESTIONS 1-9: 0
2. FEELING DOWN, DEPRESSED OR HOPELESS: NOT AT ALL
SUM OF ALL RESPONSES TO PHQ9 QUESTIONS 1 & 2: 0
SUM OF ALL RESPONSES TO PHQ QUESTIONS 1-9: 0
SUM OF ALL RESPONSES TO PHQ QUESTIONS 1-9: 0
1. LITTLE INTEREST OR PLEASURE IN DOING THINGS: NOT AT ALL
SUM OF ALL RESPONSES TO PHQ QUESTIONS 1-9: 0

## 2024-10-09 NOTE — PROGRESS NOTES
[facility]: Office Visit Established Patient     Chief Complaint:    Chief Complaint   Patient presents with    Follow-up         History of Present Illness:  Mr. Ya is a 53 y.o. male who presents today for follow-up regarding prostate cancer.  He originally presents with acute renal failure in February 2024, he was found to have severe bilateral hydronephrosis and underwent bilateral ureteral stent placement, his PSA was 112.  MRI showed two PIRADS-5 lesions in the prostate with TRUS/biopsy showing Kelsey 5+4=9 adenocarcinoma in 1 core and GS 4+5=9 in 6 cores.  PSMA PET showed uptake in the prostate as well as a presacral and internal iliac node.  He met with urology and radiation oncology, they recommended EBRT + ADT, but with a ~6 month period of ADT prior to restaging and potential radiation treatment.  He started Lupron on 6/6/24 and then presented to medical oncology to discuss additional therapy.  I discussed the pathophysiology and natural history of locally advanced prostate cancer with Mr. Ya.  I agree with ADT as has already been started, I would also add abiraterone and prednisone due to the N1 disease and for maximal cytoreduction.     Here today for follow-up of prostate cancer on Lupron and richard-pred.  He is tolerating this well overall.  He has some fatigue but this remains manageable, ECOG 0.  He remains on HD as well which could be contributing.  Appetite is good. He has no pain. Hot flashes are present but tolerable. He has no bowel issues. No dyspnea, swelling. No recent fevers or infectious symptoms. He continues dialysis three time a week.      Review of Systems:  Constitutional: Negative.   HENT: Negative.   Eyes: Negative.   Respiratory: Negative.   Cardiovascular: Negative.   Gastrointestinal: Negative.   Genitourinary: Negative.   Musculoskeletal: Negative.   Skin: Negative.   Neurological: Negative.   Endo/Heme/Allergies: Negative.   Psychiatric/Behavioral:

## 2024-10-09 NOTE — PATIENT INSTRUCTIONS
After Visit Summary or [facility] for upcoming appointment information. If you have any questions regarding your upcoming schedule please reach out to your care team through [employer] or call [phone].    Please notify your assigned Nurse Navigator of any unplanned hospital admissions or Emergency Room visits within 24 hours of discharge.    -------------------------------------------------------------------------------------------------------------------  Please call our office at [phone] if you have any  of the following symptoms:   Fever of 100.5 or greater  Chills  Shortness of breath  Swelling or pain in one leg    After office hours an answering service is available and will contact a provider for emergencies or if you are experiencing any of the above symptoms.        CHERISE RUIZ RN

## 2024-10-10 ENCOUNTER — TELEMEDICINE (OUTPATIENT)
Dept: ONCOLOGY | Age: 54
End: 2024-10-10

## 2024-10-10 DIAGNOSIS — C61 PROSTATE CANCER (HCC): Primary | ICD-10-CM

## 2024-10-24 ENCOUNTER — NURSE ONLY (OUTPATIENT)
Dept: UROLOGY | Age: 54
End: 2024-10-24
Payer: COMMERCIAL

## 2024-10-24 DIAGNOSIS — R33.9 URINARY RETENTION: Primary | ICD-10-CM

## 2024-10-24 PROCEDURE — 51702 INSERT TEMP BLADDER CATH: CPT | Performed by: UROLOGY

## 2024-11-12 ENCOUNTER — HOSPITAL ENCOUNTER (OUTPATIENT)
Dept: LAB | Age: 54
Setting detail: RECURRING SERIES
Discharge: HOME | End: 2024-11-12
Payer: COMMERCIAL

## 2024-11-12 DIAGNOSIS — C61 PROSTATE CANCER (HCC): ICD-10-CM

## 2024-11-12 LAB
COLLECTION INFORMATION: NORMAL
DATE SENT TO REF LAB: NORMAL
Lab: NORMAL

## 2024-11-12 PROCEDURE — 36415 COLL VENOUS BLD VENIPUNCTURE: CPT

## 2024-11-15 ENCOUNTER — HOSPITAL ENCOUNTER (OUTPATIENT)
Dept: PET IMAGING | Age: 54
Discharge: HOME | End: 2024-11-18
Payer: COMMERCIAL

## 2024-11-15 DIAGNOSIS — C61 PROSTATE CANCER (HCC): ICD-10-CM

## 2024-11-15 PROCEDURE — 2580000003 HC RX 258: Performed by: RADIOLOGY

## 2024-11-15 PROCEDURE — 3430000000 HC RX DIAGNOSTIC RADIOPHARMACEUTICAL: Performed by: RADIOLOGY

## 2024-11-15 PROCEDURE — A9595 HC RX DIAGNOSTIC RADIOPHARMACEUTICAL: HCPCS | Performed by: RADIOLOGY

## 2024-11-15 PROCEDURE — 78815 PET IMAGE W/CT SKULL-THIGH: CPT

## 2024-11-15 RX ORDER — SODIUM CHLORIDE 0.9 % (FLUSH) 0.9 %
20 SYRINGE (ML) INJECTION AS NEEDED
Status: DISCONTINUED | OUTPATIENT
Start: 2024-11-15 | End: 2024-11-19 | Stop reason: HOSPADM

## 2024-11-15 RX ADMIN — PIFLUFOLASTAT F-18 9.53 MILLICURIE: 80 INJECTION INTRAVENOUS at 13:38

## 2024-11-15 RX ADMIN — SODIUM CHLORIDE, PRESERVATIVE FREE 20 ML: 5 INJECTION INTRAVENOUS at 13:38

## 2024-11-18 NOTE — PROGRESS NOTES
[facility] RADIATION ONCOLOGY CONSULTATION    Patient: Saulo Ya MRN: [id number]  SSN: [id number]    YOB: 1970  Age: 54 y.o.  Sex: male      Other Providers:  Dr. Fuller, Dr. Balderrama    CHIEF COMPLAINT: CLAUDIA    DIAGNOSIS:  Cancer Staging   Prostate cancer (HCC)  Staging form: Prostate, AJCC 8th Edition  - Clinical stage from 5/15/2024: Stage KHLOE (cT1, cN1, cM0, PSA: 112, Grade Group: 5) - Signed by Lorena Stark MD on 5/15/2024     PREVIOUS RADIATION TREATMENT:  None    HISTORY OF PRESENT ILLNESS:  Saulo Ya is a 54 y.o. male who I am seeing at the request of Dr. Balderrama.     Mr. Ya was in his usual state of health until 2/22/2024 at which time he presented to the emergency department due to creatinine of 19.  CT abdomen pelvis showed severe bilateral hydronephrosis, diffuse bladder wall thickening, and enlarged prostate.  PSA was elevated to 112.  On 2/25/2024 he underwent bilateral ureteral stent placement.  MRI prostate 2/22/2024 showed PI-RADS 5 lesion in the left apex and PI-RADS 5 lesion in the bilateral base to apical transition zone.  Biopsy 4/10/2024 showed Kelsey 5+4 = 9 in 1 core and Kelsey 4+5 equals 9 in 6 cores (10 cores total).  PSMA PET shows multifocal uptake in the prostate gland with an avid presacral and left internal iliac node. He currently denies pain. He has a interiano catheter in place and is undergoing dialysis.    INTERVAL HISTORY:  Mr. Ya was evaluated by Dr. Fulton and started on Lupron with richard-prednisone. His most recent PSA 10/9/24 is 0.8. PSMA scan 11/15/24 shows a decrease in size of the prostate. There is abnormal uptake previously identified within pelvic lymph nodes which has resolved, no pathologic sized adenopathy or new te metastasis, and developing bilateral renal atrophy with interval removal of ureteral stents.     He is followed by Dr. Balderrama for ongoing urinary retention and neurogenic bladder. He recently

## 2024-11-19 ENCOUNTER — HOSPITAL ENCOUNTER (OUTPATIENT)
Dept: RADIATION ONCOLOGY | Age: 54
Setting detail: RECURRING SERIES
Discharge: HOME | End: 2024-11-22
Payer: COMMERCIAL

## 2024-11-19 ENCOUNTER — HOSPITAL ENCOUNTER (OUTPATIENT)
Dept: LAB | Age: 54
Discharge: HOME | End: 2024-11-19
Payer: COMMERCIAL

## 2024-11-19 VITALS
DIASTOLIC BLOOD PRESSURE: 78 MMHG | HEART RATE: 82 BPM | SYSTOLIC BLOOD PRESSURE: 132 MMHG | OXYGEN SATURATION: 98 % | BODY MASS INDEX: 29.39 KG/M2 | TEMPERATURE: 98.3 F | WEIGHT: 199 LBS

## 2024-11-19 DIAGNOSIS — C61 PROSTATE CANCER (HCC): Primary | ICD-10-CM

## 2024-11-19 DIAGNOSIS — C61 PROSTATE CANCER (HCC): ICD-10-CM

## 2024-11-19 LAB — PSA SERPL-MCNC: 0.4 NG/ML (ref 0–4)

## 2024-11-19 PROCEDURE — 99211 OFF/OP EST MAY X REQ PHY/QHP: CPT

## 2024-11-19 PROCEDURE — 36415 COLL VENOUS BLD VENIPUNCTURE: CPT

## 2024-11-19 PROCEDURE — 84153 ASSAY OF PSA TOTAL: CPT

## 2024-11-19 PROCEDURE — 77470 SPECIAL RADIATION TREATMENT: CPT

## 2024-11-19 NOTE — PROGRESS NOTES
Follow up Prostate Cancer.  PSMA pet scan 11-15-24.  PSA today.  Pt c/o sinus headache today.  He took otc med.  Pt has Dialysis M-W-F.   He has catheter.   Radiation teaching was completed.  Bowel prep given and explained.  Pt was instructed to clamp catheter when he arrives to department for CT sim and RT.  He will fill bladder when he arrives.  CONSENTS SIGNED FOR RT.  CT SIM SCHEDULED AFTER PROSTATE MRI.  LETTER FOR PT TO BE OUT OF WORK FROM 2 WEEKS POST SIM - 8 WEEKS FROM RT START DATE.    Avelina Henry, RN

## 2024-11-21 ENCOUNTER — NURSE ONLY (OUTPATIENT)
Dept: UROLOGY | Age: 54
End: 2024-11-21
Payer: COMMERCIAL

## 2024-11-21 DIAGNOSIS — Z46.6 URINARY CATHETER CHANGE REQUIRED: ICD-10-CM

## 2024-11-21 DIAGNOSIS — R33.9 URINARY RETENTION: Primary | ICD-10-CM

## 2024-11-21 PROCEDURE — 51702 INSERT TEMP BLADDER CATH: CPT | Performed by: UROLOGY

## 2024-11-26 ENCOUNTER — HOSPITAL ENCOUNTER (OUTPATIENT)
Dept: RADIATION ONCOLOGY | Age: 54
Setting detail: RECURRING SERIES
End: 2024-11-26
Payer: COMMERCIAL

## 2024-12-03 ENCOUNTER — HOSPITAL ENCOUNTER (OUTPATIENT)
Dept: RADIATION ONCOLOGY | Age: 54
Setting detail: RECURRING SERIES
Discharge: HOME | End: 2024-12-06

## (undated) DEVICE — MASTISOL ADHESIVE LIQ 2/3ML

## (undated) DEVICE — Device

## (undated) DEVICE — SINGLE-USE DIGITAL FLEXIBLE URETEROSCOPE: Brand: LITHOVUE

## (undated) DEVICE — SOLUTION IRRIG 3000ML H2O STRL BAG

## (undated) DEVICE — CATHETER URET 5FR L70CM OPN END SGL LUMN INJ HUB FLEXIMA

## (undated) DEVICE — SOLUTION IV STRL H2O 500 ML AQUALITE POUR BTL

## (undated) DEVICE — NITINOL STONE RETRIEVAL DEVICE: Brand: DAKOTA

## (undated) DEVICE — GUIDEWIRE UROLOGICAL STR 3 CM 0.038 INX150 CM DUAL-FLEX

## (undated) DEVICE — CATHETER F BLLN 30CC 16FR 2 W HYDRGEL COAT LESS TRAUM LUB

## (undated) DEVICE — PACK SURGICAL PROCEDURE KIT CYSTOSCOPY TOTE